# Patient Record
Sex: MALE | Race: BLACK OR AFRICAN AMERICAN | NOT HISPANIC OR LATINO | Employment: UNEMPLOYED | URBAN - METROPOLITAN AREA
[De-identification: names, ages, dates, MRNs, and addresses within clinical notes are randomized per-mention and may not be internally consistent; named-entity substitution may affect disease eponyms.]

---

## 2017-04-11 ENCOUNTER — OFFICE VISIT (OUTPATIENT)
Dept: OTOLARYNGOLOGY | Facility: CLINIC | Age: 22
End: 2017-04-11

## 2017-04-11 VITALS — WEIGHT: 190 LBS | BODY MASS INDEX: 25.73 KG/M2 | HEIGHT: 72 IN

## 2017-04-11 DIAGNOSIS — D36.10 NEUROFIBROMA: Primary | ICD-10-CM

## 2017-04-11 ASSESSMENT — PAIN SCALES - GENERAL: PAINLEVEL: NO PAIN (0)

## 2017-04-11 NOTE — MR AVS SNAPSHOT
After Visit Summary   4/11/2017    Nette Conklin    MRN: [id number]           Patient Information     Date Of Birth          1995        Visit Information        Provider Department      4/11/2017 2:30 PM Dante Davis MD [facility] Ear Nose and Throat        Today's Diagnoses     Neurofibroma    -  1       Follow-ups after your visit        Your next 10 appointments already scheduled     Apr 12, 2017  8:00 PM CDT   MR ORBIT FACE NECK W/O CONTRAST with UUMR1   [facility], [place], MRI ([facility], [facility])    [address] [facility] [address] [phone]           Take your medicines as usual, unless your doctor tells you not to. Bring a list of your current medicines to your exam (including vitamins, minerals and over-the-counter drugs). Also bring the results of similar scans you may have had.  Please remove any body piercings and hair extensions before you arrive.  Follow your doctor s orders. If you do not, we may have to postpone your exam.  You will not have contrast for this exam. You do not need to do anything special to prepare.  The MRI machine uses a strong magnet. Please wear clothes without metal (snaps, zippers). A sweatsuit works well, or we may give you a hospital gown.   **IMPORTANT** THE INSTRUCTIONS BELOW ARE ONLY FOR THOSE PATIENTS WHO HAVE BEEN TOLD THEY WILL RECEIVE SEDATION OR GENERAL ANESTHESIA DURING THEIR MRI PROCEDURE:  IF YOU WILL RECEIVE SEDATION (take medicine to help you relax during your exam):   You must get the medicine from your doctor before you arrive. Bring the medicine to the exam. Do not take it at home.   Arrive one hour early. Bring someone who can take you home after the test. Your medicine will make you sleepy. After the exam, you may not drive, take a bus or take a taxi by yourself.   No eating 8 hours before your exam. You may have clear liquids up until 4 hours before your exam. (Clear  liquids include water, clear tea, black coffee and fruit juice without pulp.)  IF YOU WILL RECEIVE ANESTHESIA (be asleep for your exam):   Arrive 1 1/2 hours early. Bring someone who can take you home after the test. You may not drive, take a bus or take a taxi by yourself.   No eating 8 hours before your exam. You may have clear liquids up until 4 hours before your exam. (Clear liquids include water, clear tea, black coffee and fruit juice without pulp.)   You will spend four to five hours in the recovery room.  Please call the Imaging Department at your exam site with any questions.              Future tests that were ordered for you today     Open Future Orders        Priority Expected Expires Ordered    MRI Orbit face neck w/o contrast Routine 2017            Who to contact     Please call your clinic at [phone] to:    Ask questions about your health    Make or cancel appointments    Discuss your medicines    Learn about your test results    Speak to your doctor   If you have compliments or concerns about an experience at your clinic, or if you wish to file a complaint, please contact [facility] Patient Relations at [phone] or email us at [email or web address]         Additional Information About Your Visit        YouAppiharSodaStream Information     Dayak is an electronic gateway that provides easy, online access to your medical records. With Dayak, you can request a clinic appointment, read your test results, renew a prescription or communicate with your care team.     To sign up for BDNAt visit the website at [email or web address]   You will be asked to enter the access code listed below, as well as some personal information. Please follow the directions to create your username and password.     Your access code is: [email or web address]  Expires: 2017  6:30 AM     Your access code will  in 90 days. If you need help or a new code, please  "contact your [facility] or call [phone] for assistance.        Care EveryWhere ID     This is your Care EveryWhere ID. This could be used by other organizations to access your [place] medical records  [id number]        Your Vitals Were     Height BMI (Body Mass Index)                1.835 m (6' 0.24\") 25.59 kg/m2           Blood Pressure from Last 3 Encounters:   12/06/14 121/58   09/15/14 113/72   02/28/13 116/62    Weight from Last 3 Encounters:   04/11/17 86.2 kg (190 lb) (88 %)*   12/06/14 83.8 kg (184 lb 12.8 oz) (90 %)*   09/15/14 85.2 kg (187 lb 12.8 oz) (92 %)*     * Growth percentiles are based on [facility] 2-20 Years data.               Primary Care Provider Office Phone # Fax #    Sarah Mcfadden [phone]       [facility] [address] [place] [id number]        Thank you!     Thank you for choosing [facility] EAR NOSE AND THROAT  for your care. Our goal is always to provide you with excellent care. Hearing back from our patients is one way we can continue to improve our services. Please take a few minutes to complete the written survey that you may receive in the mail after your visit with us. Thank you!             Your Updated Medication List - Protect others around you: Learn how to safely use, store and throw away your medicines at www.disposemymeds.org.      Notice  As of 4/11/2017  4:09 PM    You have not been prescribed any medications.      "

## 2017-04-11 NOTE — PATIENT INSTRUCTIONS
The plan will be to get a MRI to rule out any complications.  Once the scan is reviewed with Dr Davis we will notify you of the results.  Nahid Khan RN  [phone]

## 2017-04-11 NOTE — NURSING NOTE
Chief Complaint   Patient presents with     RECHECK     Return F/U     Pt states no pain today.    N Sharan GARCIAS

## 2017-04-11 NOTE — LETTER
4/11/2017       RE: Nette Conklin  [address] [place] [address]     Dear Colleague,    Thank you for referring your patient, Nette Conklin, to the [facility] EAR NOSE AND THROAT at [facility]. Please see a copy of my visit note below.    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  He actually has no complaint today but felt that since that time has passed no one has seen him in regard to the original diagnosis.      He has no vision changes.  He feels that he has no peripheral visual defects and no pain, headaches, fever or chills.      PAST MEDICAL HISTORY:  Once again reviewed.  There are no prolonged medications which he is on.      ALLERGIES:  There are no known allergies.      REVIEW OF SYSTEMS:  He is a nonsmoker, has no diplopia in all fields of gaze.  Denies alcohol use as well.      PHYSICAL EXAMINATION:  Shows mild ptosis of the right upper eyelid.  Otherwise, the eyes track in all directions without restriction.  Tympanic membranes intact and mobile.  Nares are patent.  Oral cavity is unremarkable.  Neck is supple.  There is no adenopathy.  There is no suggestion of recurrent neurofibroma.      ASSESSMENT:  Stable status post excision of right periorbital skull base mass/neurofibroma.      PLAN:  We will obtain an MRI at this time for intraorbital/pericranial assessment.  We will call him with the results.  I understand he wants no treatment, no intervention for the mild ptosis he has.          Again, thank you for allowing me to participate in the care of your patient.      Sincerely,    Dante Davis MD     cc: Sarah Mcfadden MD     [facility]     [facility] [place]  [address]      BAILEE/mike

## 2017-04-12 ENCOUNTER — HOSPITAL ENCOUNTER (OUTPATIENT)
Dept: MRI IMAGING | Facility: CLINIC | Age: 22
Discharge: HOME | End: 2017-04-12
Attending: OTOLARYNGOLOGY | Admitting: OTOLARYNGOLOGY
Payer: COMMERCIAL

## 2017-04-12 DIAGNOSIS — D36.10 NEUROFIBROMA: ICD-10-CM

## 2017-04-12 PROCEDURE — 70553 MRI BRAIN STEM W/O & W/DYE: CPT

## 2017-04-12 PROCEDURE — 25500064 ZZH RX 255 OP 636: Performed by: OTOLARYNGOLOGY

## 2017-04-12 PROCEDURE — A9585 GADOBUTROL INJECTION: HCPCS | Performed by: OTOLARYNGOLOGY

## 2017-04-12 RX ORDER — GADOBUTROL 604.72 MG/ML
10 INJECTION INTRAVENOUS ONCE
Status: COMPLETED | OUTPATIENT
Start: 2017-04-12 | End: 2017-04-12

## 2017-04-12 RX ADMIN — GADOBUTROL 10 ML: 604.72 INJECTION INTRAVENOUS at 20:38

## 2017-04-12 NOTE — PROGRESS NOTES
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  He actually has no complaint today but felt that since that time has passed no one has seen him in regard to the original diagnosis.      He has no vision changes.  He feels that he has no peripheral visual defects and no pain, headaches, fever or chills.      PAST MEDICAL HISTORY:  Once again reviewed.  There are no prolonged medications which he is on.      ALLERGIES:  There are no known allergies.      REVIEW OF SYSTEMS:  He is a nonsmoker, has no diplopia in all fields of gaze.  Denies alcohol use as well.      PHYSICAL EXAMINATION:  Shows mild ptosis of the right upper eyelid.  Otherwise, the eyes track in all directions without restriction.  Tympanic membranes intact and mobile.  Nares are patent.  Oral cavity is unremarkable.  Neck is supple.  There is no adenopathy.  There is no suggestion of recurrent neurofibroma.      ASSESSMENT:  Stable status post excision of right periorbital skull base mass/neurofibroma.      PLAN:  We will obtain an MRI at this time for intraorbital/pericranial assessment.  We will call him with the results.  I understand he wants no treatment, no intervention for the mild ptosis he has.       cc: Sarah Mcfadden MD     [facility]     [address] [place] [address]  [id number]      BAILEE/mike

## 2017-05-23 ENCOUNTER — OFFICE VISIT (OUTPATIENT)
Dept: OTOLARYNGOLOGY | Facility: CLINIC | Age: 22
End: 2017-05-23

## 2017-05-23 VITALS — BODY MASS INDEX: 25.18 KG/M2 | HEIGHT: 73 IN | WEIGHT: 190 LBS

## 2017-05-23 DIAGNOSIS — H05.30: Primary | ICD-10-CM

## 2017-05-23 ASSESSMENT — PAIN SCALES - GENERAL: PAINLEVEL: NO PAIN (0)

## 2017-05-23 NOTE — PROGRESS NOTES
HISTORY OF PRESENT ILLNESS:  Nette Conklin is now a 21-year-old who underwent excision of a right skull base lesion that extended to the orbit.  He is completely clear of the neurofibroma and returns today for discussion of his MRI findings.  The MRI showed some enhancement along the course of the optic nerve.  There is mild extension into the orbit itself.      The patient himself denies any diplopia, double vision, pain, headaches, fever or chills.  He has no meningeal signs or symptoms.      PHYSICAL EXAMINATION:  Examination does show some mild inferior orbital expansion which allows the eye to sit somewhat despotically inferiorly, but he has no double vision associated.  The eye moves in all directions without restriction.  There are no obvious lesions or masses noted extra orbited.  There is increased scleral show along the lateral portion of the lower lid and increased slitting along the lateral portion of the upper lip.      ASSESSMENT:  No obvious tumor regrowth at this time.  He does have some mild upper eyelid discrepancies.      PLAN:  I offered that he did visit with Dr. Jim Suero of oculoplastics for any reconsideration of repair and he declines that at this point.  He will follow up with me as needed.      cc: Sarah Mcfadden MD     [facility]     [facility] [facility] [place]  [address]       BAILEE/mike

## 2017-05-23 NOTE — NURSING NOTE
Chief Complaint   Patient presents with     RECHECK     Return Neurofibroma F/U     Pt states no pain today.    N Sharan LOPEZN

## 2017-05-23 NOTE — LETTER
5/23/2017       RE: Nette Conklin  [address] [place] [address]     Dear Colleague,    Thank you for referring your patient, Nette Conklin, to the [facility] EAR NOSE AND THROAT at [facility]. Please see a copy of my visit note below.    HISTORY OF PRESENT ILLNESS:  Nette Conklin is now a 21-year-old who underwent excision of a right skull base lesion that extended to the orbit.  He is completely clear of the neurofibroma and returns today for discussion of his MRI findings.  The MRI showed some enhancement along the course of the optic nerve.  There is mild extension into the orbit itself.      The patient himself denies any diplopia, double vision, pain, headaches, fever or chills.  He has no meningeal signs or symptoms.      PHYSICAL EXAMINATION:  Examination does show some mild inferior orbital expansion which allows the eye to sit somewhat despotically inferiorly, but he has no double vision associated.  The eye moves in all directions without restriction.  There are no obvious lesions or masses noted extra orbited.  There is increased scleral show along the lateral portion of the lower lid and increased slitting along the lateral portion of the upper lip.      ASSESSMENT:  No obvious tumor regrowth at this time.  He does have some mild upper eyelid discrepancies.      PLAN:  I offered that he did visit with Dr. Jim Suero of oculoplastics for any reconsideration of repair and he declines that at this point.  He will follow up with me as needed.     BAILEE/mike   Again, thank you for allowing me to participate in the care of your patient.      Sincerely,  Dante Davis MD  cc: Sarah Mcfadden MD     [facility]     [address] [place] [address]  [id number]

## 2017-05-23 NOTE — PATIENT INSTRUCTIONS
Thanks for coming in today for MRI review.  Please call/contact with further questions/concerns.     Sincerely,    SUKHWINDER Ortiz R.N.    ENT appointment scheduling [phone] option 1  ENT Triage Team  [phone] option 3  ENT  Fax: [phone]     Concerns for after clinic hours/weekend, that can not wait until clinic is open, [phone] option 4 ask for the ENT resident on call.     Address: [facility]  ENT clinic    [address] [place] [address]   4th Floor

## 2017-05-23 NOTE — MR AVS SNAPSHOT
After Visit Summary   5/23/2017    Nette Conklin    MRN: [id number]           Patient Information     Date Of Birth          1995        Visit Information        Provider Department      5/23/2017 2:15 PM Dante Davis MD [facility] Ear Nose and Throat        Today's Diagnoses     Orbital deformity, acquired    -  1      Care Instructions     Thanks for coming in today for MRI review.  Please call/contact with further questions/concerns.     Sincerely,    SUKHWINDER Ortiz R.N.    ENT appointment scheduling [phone] option 1  ENT Triage Team  [phone] option 3  ENT  Fax: [phone]     Concerns for after clinic hours/weekend, that can not wait until clinic is open, [phone] option 4 ask for the ENT resident on call.     Address: [facility]  ENT clinic    [address] [place] [address]   4th Floor               Follow-ups after your visit        Follow-up notes from your care team     Return if symptoms worsen or fail to improve.      Who to contact     Please call your clinic at [phone] to:    Ask questions about your health    Make or cancel appointments    Discuss your medicines    Learn about your test results    Speak to your doctor   If you have compliments or concerns about an experience at your clinic, or if you wish to file a complaint, please contact [facility] Patient Relations at [phone] or email us at [email or web address]         Additional Information About Your Visit        MyChart Information     Adjacent Applicationst is an electronic gateway that provides easy, online access to your medical records. With Powelectrics, you can request a clinic appointment, read your test results, renew a prescription or communicate with your care team.     To sign up for Adjacent Applicationst visit the website at [email or web address]   You will be asked to enter the access code listed below, as well as some personal information. Please follow the  "directions to create your username and password.     Your access code is: [email or web address]  Expires: 2017  6:30 AM     Your access code will  in 90 days. If you need help or a new code, please contact your [facility] or call [phone] for assistance.        Care EveryWhere ID     This is your Care EveryWhere ID. This could be used by other organizations to access your [place] medical records  [id number]        Your Vitals Were     Height BMI (Body Mass Index)                1.855 m (6' 1.03\") 25.05 kg/m2           Blood Pressure from Last 3 Encounters:   14 121/58   09/15/14 113/72   13 116/62    Weight from Last 3 Encounters:   17 86.2 kg (190 lb) (87 %)*   17 86.2 kg (190 lb) (88 %)*   14 83.8 kg (184 lb 12.8 oz) (90 %)*     * Growth percentiles are based on CDC 2-20 Years data.              Today, you had the following     No orders found for display       Primary Care Provider Office Phone # Fax #    Sarah Mcfadden [phone]       [address] [address]        Thank you!     Thank you for choosing [facility] EAR NOSE AND THROAT  for your care. Our goal is always to provide you with excellent care. Hearing back from our patients is one way we can continue to improve our services. Please take a few minutes to complete the written survey that you may receive in the mail after your visit with us. Thank you!             Your Updated Medication List - Protect others around you: Learn how to safely use, store and throw away your medicines at www.disposemymeds.org.      Notice  As of 2017 11:59 PM    You have not been prescribed any medications.      "

## 2017-07-09 ENCOUNTER — HOSPITAL ENCOUNTER (EMERGENCY)
Facility: CLINIC | Age: 22
Discharge: HOME | End: 2017-07-09
Attending: PHYSICIAN ASSISTANT | Admitting: PHYSICIAN ASSISTANT
Payer: COMMERCIAL

## 2017-07-09 ENCOUNTER — APPOINTMENT (OUTPATIENT)
Dept: GENERAL RADIOLOGY | Facility: CLINIC | Age: 22
End: 2017-07-09
Attending: PHYSICIAN ASSISTANT
Payer: COMMERCIAL

## 2017-07-09 VITALS
TEMPERATURE: 98.9 F | HEIGHT: 74 IN | DIASTOLIC BLOOD PRESSURE: 64 MMHG | WEIGHT: 200 LBS | SYSTOLIC BLOOD PRESSURE: 156 MMHG | OXYGEN SATURATION: 98 % | BODY MASS INDEX: 25.67 KG/M2 | HEART RATE: 88 BPM | RESPIRATION RATE: 18 BRPM

## 2017-07-09 DIAGNOSIS — S83.421A SPRAIN OF LATERAL COLLATERAL LIGAMENT OF RIGHT KNEE, INITIAL ENCOUNTER: ICD-10-CM

## 2017-07-09 PROCEDURE — 29505 APPLICATION LONG LEG SPLINT: CPT | Mod: RT

## 2017-07-09 PROCEDURE — 73562 X-RAY EXAM OF KNEE 3: CPT | Mod: RT

## 2017-07-09 PROCEDURE — 99284 EMERGENCY DEPT VISIT MOD MDM: CPT | Mod: 25

## 2017-07-09 PROCEDURE — 25000132 ZZH RX MED GY IP 250 OP 250 PS 637: Performed by: PHYSICIAN ASSISTANT

## 2017-07-09 RX ORDER — IBUPROFEN 200 MG
600 TABLET ORAL ONCE
Status: COMPLETED | OUTPATIENT
Start: 2017-07-09 | End: 2017-07-09

## 2017-07-09 RX ADMIN — IBUPROFEN 600 MG: 200 TABLET, FILM COATED ORAL at 14:02

## 2017-07-09 ASSESSMENT — ENCOUNTER SYMPTOMS
NUMBNESS: 0
BACK PAIN: 0

## 2017-07-09 NOTE — ED NOTES
Pt reports he was playing football yesterday, was hit from behind, fell onto right knee, felt instant pain.  Took a Oxycodone yesterday with some pain relief.  Was ambulatory yesterday but unable to tolerate ambulating on RLE today.

## 2017-07-09 NOTE — ED AVS SNAPSHOT
Emergency Department    [address] [place] [address]    Phone:  [phone]    Fax:  [phone]                                       Nette Conklin   MRN: [id number]    Department:   Emergency Department   Date of Visit:  7/9/2017           After Visit Summary Signature Page     I have received my discharge instructions, and my questions have been answered. I have discussed any challenges I see with this plan with the nurse or doctor.    ..........................................................................................................................................  Patient/Patient Representative Signature      ..........................................................................................................................................  Patient Representative Print Name and Relationship to Patient    ..................................................               ................................................  Date                                            Time    ..........................................................................................................................................  Reviewed by Signature/Title    ...................................................              ..............................................  Date                                                            Time

## 2017-07-09 NOTE — DISCHARGE INSTRUCTIONS
Use Acetaminophen and/or Ibuprofen as needed for pain.      Rest, ice and elevate the knee.     Follow-up with orthopedics in 5-7 days for a recheck.     Return to the ED with worsening pain, numbness, tingling, weakness, or if you become worse in any way.       Discharge Instructions  Extremity Injury    You were seen today for an injury to an extremity (arm, hand, leg, or foot). You may have a bruise, strain, or fracture (broken bone).    Return to the Emergency Department or see your regular doctor if your injured area is not back to normal within 5-7 days.    Return to the Emergency Department right away if:    Your pain seems to change or get worse or there is pain in a new area.    Your extremity becomes pale, cool, blue, or numb or tingling past the injury.    You have more drainage, redness or pain in the area of the cut or abrasion.    You have pain that you can t control with the medicine recommended or prescribed here, or you have pain that seems too much for your injury.    Your child will not stop crying or is much more fussy than normal.    You have new symptoms or anything that worries you.    What to Expect:    Your swelling and pain may be worse the day after your injury, but should not be severe and should start getting better after that. You should not have new symptoms and your pain should not get worse.    You may start to get a bruise over the injured area or below the injured area.    Your movement and strength should get better with time.    Some injuries may not show up until after you have left the Emergency Department so it is important to follow-up with your regular doctor.    Your injury may prevent you from working.  Follow-up with your regular doctor to get a work release note.    Pain medications or your injury may make it unsafe to drive or operate machinery.    Home Care:    Apply ice your injured area for 15 minutes at a time, at least 3 times a day. Use a cloth between the ice bag  and your skin to prevent frostbite.     Do not sleep with an ice pack or heating pad on, since this can cause burns or skin injury.    Rest your injured area for at least 1-2 days. After that you may start using your extremity again as long as there is not too much pain.     Raise the injured area above the level of your heart as much as possible in the first 1-2 days.    Use Tylenol  (acetaminophen), Motrin (ibuprofen), or Advil  (ibuprofen) for your pain unless you have an allergy or are told not to use these medications by your doctor.  Take the medications as instructed on the package. Tylenol  (acetaminophen) is in many prescription medicines and non-prescription medicines--check all of your medicines to be sure you aren t taking more than 3000 mg per day.    You may use an elastic bandage (Ace  Wrap) if it makes you more comfortable. Wrap it just tight enough to provide light compression, like a new pair of socks feels. Loosen the bandage if you have swelling past the bandage.      Please follow any other instructions that were discussed with you by your doctor.    MORE INFORMATION:    X-rays:  X-rays done today were read by your doctor but will also be read by a radiologist.  We will contact you if the radiologist sees anything different on the x-ray.  Your regular doctor may also want to review your x-rays on follow-up.    You could have a fracture (break), even if we told you your x-rays were normal. X-rays are not always certain, and some fractures are hard to see and may not show up right away.  Also, your x-ray may look like you have a fracture, even though you do not.  It is important to follow-up with your regular doctor.     Stretching:  If your injury was to your arm or shoulder and your doctor put you in a sling or an immobilizer, it is important that you take off your immobilizer within 3 days and stretch/move your shoulder, unless your doctor specifically tells you to not move your shoulder.  This  is to prevent further injury such as a  frozen shoulder .     If you were given a prescription for medicine here today, be sure to read all of the information (including the package insert) that comes with your prescription.  This will include important information about the medicine, its side effects, and any warnings that you need to know about.  The pharmacist who fills the prescription can provide more information and answer questions you may have about the medicine.  If you have questions or concerns that the pharmacist cannot address, please call or return to the Emergency Department.         Remember that you can always come back to the Emergency Department if you are not able to see your regular doctor in the amount of time listed above, if you get any new symptoms, or if there is anything that worries you.        Knee Sprain    A sprain is an injury to the ligaments or capsule that holds a joint together. There are no broken bones. Most sprains take 3 to 6 weeks to heal. If it a severe sprain where the ligament is completely torn, it can take months to recover.  Most knee sprains are treated with a splint, knee immobilizer brace, or elastic wrap for support. Severe sprains may require surgery.  Home care    Stay off the injured leg as much as possible until you can walk on it without pain. If you have a lot of pain with walking, crutches or a walker may be prescribed. (These can be rented or purchased at many pharmacies and surgical or orthopedic supply stores). Follow your healthcare provider's advice about when to begin putting weight on that leg.    Keep your leg elevated to reduce pain and swelling. When sleeping, place a pillow under the injured leg. When sitting, support the injured leg so it is level with your waist. This is very important during the first 48 hours.    Apply an ice pack over the injured area for 15 to 20 minutes every 3 to 6 hours. You should do this for the first 24 to 48 hours. You  can make an ice pack by filling a plastic bag that seals at the top with ice cubes and then wrapping it with a thin towel. Continue to use ice packs for relief of pain and swelling as needed. As the ice melts, be careful to avoid getting your wrap, splint, or cast wet. After 48 hours, apply heat (warm shower or warm bath) for 15 to 20 minutes several times a day, or alternate ice and heat. You can place the ice pack directly over the splint. If you have to wear a hook-and-loop knee brace, you can open it to apply the ice pack, or heat, directly to the knee. Never put ice directly on the skin. Always wrap the ice in a towel or other type of cloth.    You may use over-the-counter pain medicine to control pain, unless another pain medicine was prescribed.If you have chronic liver or kidney disease or ever had a stomach ulcer or GI bleeding, talk with your healthcare provider before using these medicines.    If you were given a splint, keep it completely dry at all times. Bathe with your splint out of the water, protected with 2 large plastic bags, rubber-banded at the top end. If a fiberglass splint gets wet, you can dry it with a hair dryer. If you have a hook-and-loop knee brace, you can remove this to bathe, unless told otherwise.  Follow-up care  Follow up with your doctor as advised. Any X-rays you had today don t show any broken bones, breaks, or fractures. Sometimes fractures don t show up on the first X-ray. Bruises and sprains can sometimes hurt as much as a fracture. These injuries can take time to heal completely. If your symptoms don t improve or they get worse, talk with your doctor. You may need a repeat X-ray. If X-rays were taken, you will be told of any new findings that may affect your care.  Call 911  Call 911 if you have:     Shortness of breath     Chest pain  When to seek medical advice  Call your healthcare provider right away if any of these occur:    The splint or knee immobilizer brace becomes  wet or soft    The fiberglass cast or splint remains wet for more than 24 hours    Pain or swelling increases    The injured leg or toes become cold, blue, numb, or tingly  Date Last Reviewed: 11/20/2015 2000-2017 [employer]. [address] [place] [id number]. All rights reserved. This information is not intended as a substitute for professional medical care. Always follow your healthcare professional's instructions.

## 2017-07-09 NOTE — ED PROVIDER NOTES
"  History     Chief Complaint:  Knee Pain (rt knee)    HPI   Nette Conklin is a 22 year old male who presents with knee pain. The patient reports that he was hit from behind yesterday while playing football. He fell down on his right knee and felt it buckle. He could walk immediately after, but has been having gradually worsening pain with significant lateral knee pain today and the knee feels like it is going to keep buckling on him. He denies any numbness, tingling, or history of knee injuries. The pain does not radiate. He has been icing on and off since the incident as well as taking one oxycodone for pain already today. He is currently having trouble bending the knee here in the ED.    Allergies:  No Known Drug Allergies     Medications:    The patient is not currently taking any prescribed medications.  Took an Oxycodone today    Past Medical History:    Acne  Mass of eye, right    Past Surgical History:    Craniotomy, removal of temporal lobe lesion  Orbitotomy  Resection of right orbital mass    Family History:    The patient denies any relevant family medical history.    Social History:  The patient was accompanied to the ED by his brother and sister-in-law.  Smoking Status: No  Smokeless Tobacco: No  Alcohol Use: No  Marital Status:  Single     Review of Systems   Musculoskeletal: Positive for gait problem. Negative for back pain.        + right knee pain   Skin: Negative for color change and wound.   Neurological: Negative for weakness and numbness.     Physical Exam   Vitals:  Patient Vitals for the past 24 hrs:   BP Temp Temp src Pulse Resp SpO2 Height Weight   07/09/17 1512 156/64 - - 88 18 98 % - -   07/09/17 1344 165/58 98.9  F (37.2  C) Oral 82 18 97 % 1.88 m (6' 2\") 90.7 kg (200 lb)        Physical Exam  General:         Resting comfortably on the gurney.     Head:              The scalp, head and face appear normal. No evidence of trauma.    Resp:              Non-labored breathing. No " tachypnea.    CV:                  DP and PT pulses 2+ on the right                            Capillary refill less than two seconds right toes.    MS:                  Normal muscular tone.                            5/5 and symmetric strength with dorsi- and plantar-flexion at the great toe and ankle, limited right knee flexion and extension due to pain                           Normal and symmetric ROM with dorsi- and plantar-flexion at the great toe and ankle, limited right knee flexion and extension due to pain                          Right lateral knee tenderness and swelling with palpation, the remainder of the right lower extremity is non-tender to palpation.      Negative right anterior and posterior drawer. No laxity with valgus stress, but mild laxity and pain with varus stress, still good end point with varus stress   Neuro:            Awake and alert.                            Sensation intact to light touch bilateral lower extremities, including distal to the injury.    Skin:               No rash, ecchymosis, abrasions or lacerations.   Psych:             Normal affect. Appropriate interactions.       Emergency Department Course     Imaging:  Radiology findings were communicated with the patient who voiced understanding of the findings.  Knee XR, 3 views, right:  No evidence of acute fracture or malalignment. There is a  right knee effusion.  Per Radiologist.     Interventions:  1402 Ibuprofen, 600 mg, PO     Emergency Department Course:  Nursing notes and vitals reviewed.  I performed an exam of the patient as documented above.   The patient was sent for a XR while in the emergency department, results above.   The patient was updated on the results of their imaging.    I discussed the treatment plan with the patient. They expressed understanding of this plan and consented to discharge. They will be discharged home with instructions for care and follow up. In addition, the patient will return to  the emergency department if their symptoms persist, worsen, if new symptoms arise or if there is any concern.  All questions were answered.      Impression & Plan      Medical Decision Making:  Nette Conklin is a 22 year old male  who presents for evaluation of the right knee.  His exam findings are noted above, most pertinent is no redness, warmth to knee making septic arthritis and/or crystal disease of joint very unlikely.  Signs and symptoms are consistent with a knee sprain of his LCL. XR negative for fracture, dislocation or other acute abnormality other than a knee effusion.  A broad differential was also considered including sprain, strain, fracture, tendon rupture, nerve impingement/compromise, referred pain. Supportive outpatient management is indicated. He was place in a knee immobilizer and given crutches. Orthopedic follow-up in 5-7 days.  Rest, ice, and elevation treatment was discussed with the patient. Sprain instructions provided and reasons to return to the ED discussed. I discussed the results, plan and any additional questions with the patient. He verbalized understanding and agreement with the plan.      Diagnosis:    ICD-10-CM    1. Sprain of lateral collateral ligament of right knee, initial encounter S83.421A      Disposition:   Discharge    Scribe Disclosure:  I, Sammy Shields, am serving as a scribe at 1:41 PM on 7/9/2017 to document services personally performed by No att. providers found, based on my observations and the provider's statements to me.    7/9/2017    EMERGENCY DEPARTMENT       Christianne Cabral PA-C  07/10/17 0979

## 2017-07-09 NOTE — ED AVS SNAPSHOT
Emergency Department    [address] [facility] [address]    Phone:  [phone]    Fax:  [phone]                                       Nette Conklin   MRN: [id number]    Department:   Emergency Department   Date of Visit:  7/9/2017           Patient Information     Date Of Birth          1995        Your diagnoses for this visit were:     Sprain of lateral collateral ligament of right knee, initial encounter        You were seen by Christianne Cabral PA-C.      Follow-up Information     Follow up with Orthopaedics, [facility] In 1 week.    Contact information:    [address] [id number] [phone]          Follow up with  Emergency Department.    Specialty:  EMERGENCY MEDICINE    Why:  If symptoms worsen    Contact information:    [address] [facility] [address] [phone]        Discharge Instructions       Use Acetaminophen and/or Ibuprofen as needed for pain.      Rest, ice and elevate the knee.     Follow-up with orthopedics in 5-7 days for a recheck.     Return to the ED with worsening pain, numbness, tingling, weakness, or if you become worse in any way.       Discharge Instructions  Extremity Injury    You were seen today for an injury to an extremity (arm, hand, leg, or foot). You may have a bruise, strain, or fracture (broken bone).    Return to the Emergency Department or see your regular doctor if your injured area is not back to normal within 5-7 days.    Return to the Emergency Department right away if:    Your pain seems to change or get worse or there is pain in a new area.    Your extremity becomes pale, cool, blue, or numb or tingling past the injury.    You have more drainage, redness or pain in the area of the cut or abrasion.    You have pain that you can t control with the medicine recommended or prescribed here, or you have pain that seems too much for your injury.    Your child will not stop crying or is much more  fussy than normal.    You have new symptoms or anything that worries you.    What to Expect:    Your swelling and pain may be worse the day after your injury, but should not be severe and should start getting better after that. You should not have new symptoms and your pain should not get worse.    You may start to get a bruise over the injured area or below the injured area.    Your movement and strength should get better with time.    Some injuries may not show up until after you have left the Emergency Department so it is important to follow-up with your regular doctor.    Your injury may prevent you from working.  Follow-up with your regular doctor to get a work release note.    Pain medications or your injury may make it unsafe to drive or operate machinery.    Home Care:    Apply ice your injured area for 15 minutes at a time, at least 3 times a day. Use a cloth between the ice bag and your skin to prevent frostbite.     Do not sleep with an ice pack or heating pad on, since this can cause burns or skin injury.    Rest your injured area for at least 1-2 days. After that you may start using your extremity again as long as there is not too much pain.     Raise the injured area above the level of your heart as much as possible in the first 1-2 days.    Use Tylenol  (acetaminophen), Motrin (ibuprofen), or Advil  (ibuprofen) for your pain unless you have an allergy or are told not to use these medications by your doctor.  Take the medications as instructed on the package. Tylenol  (acetaminophen) is in many prescription medicines and non-prescription medicines--check all of your medicines to be sure you aren t taking more than 3000 mg per day.    You may use an elastic bandage (Ace  Wrap) if it makes you more comfortable. Wrap it just tight enough to provide light compression, like a new pair of socks feels. Loosen the bandage if you have swelling past the bandage.      Please follow any other instructions that  were discussed with you by your doctor.    MORE INFORMATION:    X-rays:  X-rays done today were read by your doctor but will also be read by a radiologist.  We will contact you if the radiologist sees anything different on the x-ray.  Your regular doctor may also want to review your x-rays on follow-up.    You could have a fracture (break), even if we told you your x-rays were normal. X-rays are not always certain, and some fractures are hard to see and may not show up right away.  Also, your x-ray may look like you have a fracture, even though you do not.  It is important to follow-up with your regular doctor.     Stretching:  If your injury was to your arm or shoulder and your doctor put you in a sling or an immobilizer, it is important that you take off your immobilizer within 3 days and stretch/move your shoulder, unless your doctor specifically tells you to not move your shoulder.  This is to prevent further injury such as a  frozen shoulder .     If you were given a prescription for medicine here today, be sure to read all of the information (including the package insert) that comes with your prescription.  This will include important information about the medicine, its side effects, and any warnings that you need to know about.  The pharmacist who fills the prescription can provide more information and answer questions you may have about the medicine.  If you have questions or concerns that the pharmacist cannot address, please call or return to the Emergency Department.         Remember that you can always come back to the Emergency Department if you are not able to see your regular doctor in the amount of time listed above, if you get any new symptoms, or if there is anything that worries you.        Knee Sprain    A sprain is an injury to the ligaments or capsule that holds a joint together. There are no broken bones. Most sprains take 3 to 6 weeks to heal. If it a severe sprain where the ligament is  completely torn, it can take months to recover.  Most knee sprains are treated with a splint, knee immobilizer brace, or elastic wrap for support. Severe sprains may require surgery.  Home care    Stay off the injured leg as much as possible until you can walk on it without pain. If you have a lot of pain with walking, crutches or a walker may be prescribed. (These can be rented or purchased at many pharmacies and surgical or orthopedic supply stores). Follow your healthcare provider's advice about when to begin putting weight on that leg.    Keep your leg elevated to reduce pain and swelling. When sleeping, place a pillow under the injured leg. When sitting, support the injured leg so it is level with your waist. This is very important during the first 48 hours.    Apply an ice pack over the injured area for 15 to 20 minutes every 3 to 6 hours. You should do this for the first 24 to 48 hours. You can make an ice pack by filling a plastic bag that seals at the top with ice cubes and then wrapping it with a thin towel. Continue to use ice packs for relief of pain and swelling as needed. As the ice melts, be careful to avoid getting your wrap, splint, or cast wet. After 48 hours, apply heat (warm shower or warm bath) for 15 to 20 minutes several times a day, or alternate ice and heat. You can place the ice pack directly over the splint. If you have to wear a hook-and-loop knee brace, you can open it to apply the ice pack, or heat, directly to the knee. Never put ice directly on the skin. Always wrap the ice in a towel or other type of cloth.    You may use over-the-counter pain medicine to control pain, unless another pain medicine was prescribed.If you have chronic liver or kidney disease or ever had a stomach ulcer or GI bleeding, talk with your healthcare provider before using these medicines.    If you were given a splint, keep it completely dry at all times. Bathe with your splint out of the water, protected  with 2 large plastic bags, rubber-banded at the top end. If a fiberglass splint gets wet, you can dry it with a hair dryer. If you have a hook-and-loop knee brace, you can remove this to bathe, unless told otherwise.  Follow-up care  Follow up with your doctor as advised. Any X-rays you had today don t show any broken bones, breaks, or fractures. Sometimes fractures don t show up on the first X-ray. Bruises and sprains can sometimes hurt as much as a fracture. These injuries can take time to heal completely. If your symptoms don t improve or they get worse, talk with your doctor. You may need a repeat X-ray. If X-rays were taken, you will be told of any new findings that may affect your care.  Call 911  Call 911 if you have:     Shortness of breath     Chest pain  When to seek medical advice  Call your healthcare provider right away if any of these occur:    The splint or knee immobilizer brace becomes wet or soft    The fiberglass cast or splint remains wet for more than 24 hours    Pain or swelling increases    The injured leg or toes become cold, blue, numb, or tingly  Date Last Reviewed: 11/20/2015 2000-2017 [employer]. [address] [place] [address], [place] [address]. All rights reserved. This information is not intended as a substitute for professional medical care. Always follow your healthcare professional's instructions.          24 Hour Appointment Hotline       To make an appointment at any [facility], call [phone] ([phone]). If you don't have a family doctor or clinic, we will help you find one. [facility] are conveniently located to serve the needs of you and your family.             Review of your medicines      Notice     You have not been prescribed any medications.            Procedures and tests performed during your visit     Knee XR, 3 views, right      Orders Needing Specimen Collection     None      Pending Results     No orders found from 7/7/2017 to  7/10/2017.            Pending Culture Results     No orders found from 7/7/2017 to 7/10/2017.            Pending Results Instructions     If you had any lab results that were not finalized at the time of your Discharge, you can call the ED Lab Result RN at [phone]. You will be contacted by this team for any positive Lab results or changes in treatment. The nurses are available 7 days a week from 10A to 6:30P.  You can leave a message 24 hours per day and they will return your call.        Test Results From Your Hospital Stay        7/9/2017  2:50 PM      Narrative     XR KNEE RT 3 VW 7/9/2017 2:14 PM    HISTORY: Injury.    COMPARISON: None.        Impression     IMPRESSION: No evidence of acute fracture or malalignment. There is a  right knee effusion.    ALBER MOON MD                Clinical Quality Measure: Blood Pressure Screening     Your blood pressure was checked while you were in the emergency department today. The last reading we obtained was  BP: 156/64 . Please read the guidelines below about what these numbers mean and what you should do about them.  If your systolic blood pressure (the top number) is less than 120 and your diastolic blood pressure (the bottom number) is less than 80, then your blood pressure is normal. There is nothing more that you need to do about it.  If your systolic blood pressure (the top number) is 120-139 or your diastolic blood pressure (the bottom number) is 80-89, your blood pressure may be higher than it should be. You should have your blood pressure rechecked within a year by a primary care provider.  If your systolic blood pressure (the top number) is 140 or greater or your diastolic blood pressure (the bottom number) is 90 or greater, you may have high blood pressure. High blood pressure is treatable, but if left untreated over time it can put you at risk for heart attack, stroke, or kidney failure. You should have your blood pressure rechecked by a primary care  "provider within the next 4 weeks.  If your provider in the emergency department today gave you specific instructions to follow-up with your doctor or provider even sooner than that, you should follow that instruction and not wait for up to 4 weeks for your follow-up visit.        Thank you for choosing [facility]       Thank you for choosing [facility] for your care. Our goal is always to provide you with excellent care. Hearing back from our patients is one way we can continue to improve our services. Please take a few minutes to complete the written survey that you may receive in the mail after you visit with us. Thank you!        [employer] Information     [employer] lets you send messages to your doctor, view your test results, renew your prescriptions, schedule appointments and more. To sign up, go to [email or web address] . Click on \"Log in\" on the left side of the screen, which will take you to the Welcome page. Then click on \"Sign up Now\" on the right side of the page.     You will be asked to enter the access code listed below, as well as some personal information. Please follow the directions to create your username and password.     Your access code is: [email or web address]  Expires: 10/7/2017  3:12 PM     Your access code will  in 90 days. If you need help or a new code, please call your [facility] clinic or [phone].        Care EveryWhere ID     This is your Care EveryWhere ID. This could be used by other organizations to access your [facility] medical records  [id number]        Equal Access to Services     JOSSELYN ONTIVEROS : Hadii deny millero So[place], waaxda luqadaha, qaybta kaalmada gavin dick . [facility] [phone].    ATENCIÓN: Si habla español, tiene a vegas disposición servicios gratuitos de asistencia lingüística. Llame al [phone].    We comply with applicable federal civil rights laws and [place] laws. We do not discriminate on the basis of race, color, " national origin, age, disability sex, sexual orientation or gender identity.            After Visit Summary       This is your record. Keep this with you and show to your community pharmacist(s) and doctor(s) at your next visit.

## 2017-07-10 ASSESSMENT — ENCOUNTER SYMPTOMS
WOUND: 0
COLOR CHANGE: 0
WEAKNESS: 0

## 2018-03-14 ENCOUNTER — OFFICE VISIT (OUTPATIENT)
Dept: ORTHOPEDICS | Facility: CLINIC | Age: 23
End: 2018-03-14
Payer: COMMERCIAL

## 2018-03-14 ENCOUNTER — RADIANT APPOINTMENT (OUTPATIENT)
Dept: GENERAL RADIOLOGY | Facility: CLINIC | Age: 23
End: 2018-03-14
Payer: COMMERCIAL

## 2018-03-14 ENCOUNTER — RADIANT APPOINTMENT (OUTPATIENT)
Dept: MRI IMAGING | Facility: CLINIC | Age: 23
End: 2018-03-14
Attending: ORTHOPAEDIC SURGERY
Payer: COMMERCIAL

## 2018-03-14 VITALS — HEIGHT: 74 IN | BODY MASS INDEX: 23.1 KG/M2 | WEIGHT: 180 LBS

## 2018-03-14 DIAGNOSIS — G89.29 CHRONIC PAIN OF RIGHT KNEE: ICD-10-CM

## 2018-03-14 DIAGNOSIS — M25.561 CHRONIC PAIN OF RIGHT KNEE: Primary | ICD-10-CM

## 2018-03-14 DIAGNOSIS — M25.561 CHRONIC PAIN OF RIGHT KNEE: ICD-10-CM

## 2018-03-14 DIAGNOSIS — G89.29 CHRONIC PAIN OF RIGHT KNEE: Primary | ICD-10-CM

## 2018-03-14 NOTE — NURSING NOTE
Reason For Visit:   Chief Complaint   Patient presents with     Consult     Right knee       ?  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  Work status?  Full time.  Date of injury: 07/07/2017  Type of injury: Playing football when he was hit from the side.  Date of surgery: 08/02/2017  Type of surgery: Right knee ACL Reconstruction with Dr. Lopez at [facility]  Smoker: No  Request smoking cessation information: No    Pain Assessment  Patient Currently in Pain: No

## 2018-03-14 NOTE — LETTER
Date:March 15, 2018      Patient was self referred, no letter generated. Do not send.        [facility] Information

## 2018-03-14 NOTE — PROGRESS NOTES
"Nette Conlkin  is a pleasant 22-year-old male seen to my orthopedic clinic today for evaluation of his right knee.  He sustained injury to his right knee in July 2017 he underwent ACL reconstruction August 2, 2017 the operative report is in care everywhere.  This was bone tendon bone autograft partial lateral meniscectomy a medial meniscus tear was identified, found to be stable, and left alone.  He comes in today with a chief complaint of \"my leg is bent\" he is now 7-1/2 months following his ACL reconstruction.  He is done physical therapy until approximately December when he reports that he stopped secondary to his job.  He presents to see me today for second opinion.    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  He enjoys working out.    Review of systems: Negative for fever chills sweats no chest pain no shortness breath no nausea vomiting no bowel or bladder symptoms tend other systems reviewed and negative    Physical exam: Pleasant male no acute distress articulate and interactive.  Range of motion today is approximately 12-90 .  His knee is stable to varus and valgus stress testing.  I could not demonstrate instability on anterior or posterior drawer testing.  Lockman was limited by guarding.  Pivot shift was limited by patient   Limited range of motion and guarding.  Neurovascularly he is intact.    MRI from before his injury shows a rupture of his ACL, lateral meniscus tear.    Clinical assessment  1.  7 a half months following bone tendon bone autograft ACL reconstruction.  No definite recurrent instability though graft is difficult to assess.  2.  Arthrofibrosis with motion limited 12-90   3.  Status post partial lateral meniscectomy.  Postoperative pictures are reasonable " meniscectomy.  4.  Known medial meniscus tear that was found to be stable.    Plan:  We will get new radiographs today.  Allow slow the postoperative tunnels and hardware.  We will get an MRI of his knee.  I will plan to see him back in clinic when complete.  In a very minimum he may need a manipulation under anesthesia and arthroscopic lysis of adhesions.  Depending on the appearance of the graft as well as the position of the tunnels and hardware, he may need revision ACL reconstruction.

## 2018-03-14 NOTE — LETTER
"  3/14/2018      RE: Nette Conklin  [address] [place] [address]       Nette Conklin  is a pleasant 22-year-old male seen to my orthopedic clinic today for evaluation of his right knee.  He sustained injury to his right knee in July 2017 he underwent ACL reconstruction August 2, 2017 the operative report is in care everywhere.  This was bone tendon bone autograft partial lateral meniscectomy a medial meniscus tear was identified, found to be stable, and left alone.  He comes in today with a chief complaint of \"my leg is bent\" he is now 7-1/2 months following his ACL reconstruction.  He is done physical therapy until approximately December when he reports that he stopped secondary to his job.  He presents to see me today for second opinion.    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    Review of systems: Negative for fever chills sweats no chest pain no shortness breath no nausea vomiting no bowel or bladder symptoms tend other systems reviewed and negative    Physical exam: Pleasant male no acute distress articulate and interactive.  Range of motion today is approximately 12-90 .  His knee is stable to varus and valgus stress testing.  I could not demonstrate instability on anterior or posterior drawer testing.  Lockman was limited by guarding.  Pivot shift was limited by patient   Limited range of motion and guarding.  Neurovascularly he is intact.    MRI from before his injury shows a rupture of his ACL, lateral meniscus tear.    Clinical assessment  1.  7 a half months following bone tendon bone autograft ACL reconstruction.  No definite recurrent instability though graft is difficult to assess.  2.  Arthrofibrosis with motion limited 12-90   3. "  Status post partial lateral meniscectomy.  Postoperative pictures are reasonable meniscectomy.  4.  Known medial meniscus tear that was found to be stable.    Plan:  We will get new radiographs today.  Allow slow the postoperative tunnels and hardware.  We will get an MRI of his knee.  I will plan to see him back in clinic when complete.  In a very minimum he may need a manipulation under anesthesia and arthroscopic lysis of adhesions.  Depending on the appearance of the graft as well as the position of the tunnels and hardware, he may need revision ACL reconstruction.    Jamie Russell MD

## 2018-03-14 NOTE — MR AVS SNAPSHOT
After Visit Summary   3/14/2018    Nette Conklin    MRN: [id number]           Patient Information     Date Of Birth          1995        Visit Information        Provider Department      3/14/2018 11:00 AM Jamie Russell MD [facility]        Today's Diagnoses     Chronic pain of right knee    -  1       Follow-ups after your visit        Your next 10 appointments already scheduled     Mar 14, 2018  4:00 PM CDT   (Arrive by 3:45 PM)   MR KNEE RIGHT W/O CONTRAST with [id number]   [facility] [place] [facility] ([facility] [place])    [address] [address] [phone]           Take your medicines as usual, unless your doctor tells you not to. Bring a list of your current medicines to your exam (including vitamins, minerals and over-the-counter drugs). Also bring the results of similar scans you may have had.  Please remove any body piercings and hair extensions before you arrive.  Follow your doctor s orders. If you do not, we may have to postpone your exam.  You may or may not receive IV contrast for this exam pending the discretion of the Radiologist.  You do not need to do anything special to prepare.  The MRI machine uses a strong magnet. Please wear clothes without metal (snaps, zippers). A sweatsuit works well, or we may give you a hospital gown.   **IMPORTANT** THE INSTRUCTIONS BELOW ARE ONLY FOR THOSE PATIENTS WHO HAVE BEEN PRESCRIBED SEDATION OR GENERAL ANESTHESIA DURING THEIR MRI PROCEDURE:  IF YOUR DOCTOR PRESCRIBED ORAL SEDATION (take medicine to help you relax during your exam):   You must get the medicine from your doctor (oral medication) before you arrive. Bring the medicine to the exam. Do not take it at home. You ll be told when to take it upon arriving for your exam.   Arrive one hour early. Bring someone who can take you home after the test. Your medicine will make you sleepy. After the exam, you may  not drive, take a bus or take a taxi by yourself.  IF YOUR DOCTOR PRESCRIBED IV SEDATION:   Arrive one hour early. Bring someone who can take you home after the test. Your medicine will make you sleepy. After the exam, you may not drive, take a bus or take a taxi by yourself.   No eating 6 hours before your exam. You may have clear liquids up until 4 hours before your exam. (Clear liquids include water, clear tea, black coffee and fruit juice without pulp.)  IF YOUR DOCTOR PRESCRIBED ANESTHESIA (be asleep for your exam):   Arrive 1 1/2 hours early. Bring someone who can take you home after the test. You may not drive, take a bus or take a taxi by yourself.   No eating 8 hours before your exam. You may have clear liquids up until 4 hours before your exam. (Clear liquids include water, clear tea, black coffee and fruit juice without pulp.)   You will spend four to five hours in the recovery room.  Please call the Imaging Department at your exam site with any questions.            Mar 19, 2018 10:50 AM CDT   (Arrive by 10:35 AM)   RETURN KNEE with Jamie Russell MD   [facility] ([facility])    [address] [place] [address] [phone]              Future tests that were ordered for you today     Open Future Orders        Priority Expected Expires Ordered    MR Knee Right w/o Contrast Routine  3/14/2019 3/14/2018            Who to contact     Please call your clinic at [phone] to:    Ask questions about your health    Make or cancel appointments    Discuss your medicines    Learn about your test results    Speak to your doctor            Additional Information About Your Visit        OmegawaveharTheWrap Information     [facility] is an electronic gateway that provides easy, online access to your medical records. With [facility], you can request a clinic appointment, read your test results, renew a prescription or communicate with your care team.    "  To sign up for [employer] visit the website at [email or web address]   You will be asked to enter the access code listed below, as well as some personal information. Please follow the directions to create your username and password.     Your access code is: [email or web address]  Expires: 6/3/2018  7:30 AM     Your access code will  in 90 days. If you need help or a new code, please contact your [facility] or call [phone] for assistance.        Care EveryWhere ID     This is your Care EveryWhere ID. This could be used by other organizations to access your [facility] medical records  [id number]        Your Vitals Were     Height BMI (Body Mass Index)                1.88 m (6' 2\") 23.11 kg/m2           Blood Pressure from Last 3 Encounters:   17 156/64   14 121/58   09/15/14 113/72    Weight from Last 3 Encounters:   18 81.6 kg (180 lb)   17 90.7 kg (200 lb)   17 86.2 kg (190 lb) (87 %)*     * Growth percentiles are based on [facility] 2-20 Years data.               Primary Care Provider    None Specified       No primary provider on file.        Equal Access to Services     JOSSELYN ONTIVEROS : Hadii deny millero Sonorbertoali, waaxda luqadaha, qaybta kaalmada adeegyada, gavin squires . So [facility] [phone].    ATENCIÓN: Si habla español, tiene a vegas disposición servicios gratuitos de asistencia lingüística. betaworks al [phone].    We comply with applicable federal civil rights laws and [place] laws. We do not discriminate on the basis of race, color, national origin, age, disability, sex, sexual orientation, or gender identity.            Thank you!     Thank you for choosing [facility]  for your care. Our goal is always to provide you with excellent care. Hearing back from our patients is one way we can continue to improve our services. Please take a few minutes to complete the written survey that you may receive in the " mail after your visit with us. Thank you!             Your Updated Medication List - Protect others around you: Learn how to safely use, store and throw away your medicines at www.disposemymeds.org.      Notice  As of 3/14/2018 11:49 AM    You have not been prescribed any medications.

## 2018-03-19 ENCOUNTER — OFFICE VISIT (OUTPATIENT)
Dept: ORTHOPEDICS | Facility: CLINIC | Age: 23
End: 2018-03-19
Payer: COMMERCIAL

## 2018-03-19 DIAGNOSIS — M24.661 ARTHROFIBROSIS OF KNEE JOINT, RIGHT: Primary | ICD-10-CM

## 2018-03-19 NOTE — PROGRESS NOTES
DIAGNOSIS:   1.   2. Post ACL arthrofibrosis    PROCEDURES:  1. A 8 months status post BTB ACL reconstruction at outside hospital ([place])    HISTORY:  Nette Conklin is a 22 year old returns in follow-up with MRI from 3/14/2018 of right knee.  Symptoms have not changed since since last visit.  He is here to review options for arthrofibrosis fibrosis of right knee.  We discussed that he is unlikely to improve from further therapy at this time and would be best treated with an arthroscopic lysis of adhesions and manipulation under anesthesia.      Of note, he reports that his father had a lysis of adhesions after his ACL reconstruction.  We also discussed the use of a CPM, he would be agreeable if this is necessary.    EXAM:     General: Awake, Alert, and oriented. Articulates and communicates with a normal affect     Right lower Extremity:    Incisions well healed without evidence of infection    No effusion or ecchymosis noted.    Right knee range of motion from 15-90 .    Stable varus and valgus at 0 and 30 .    1A Lockman intact anterior drawer.    Neurovascularly intact    IMAGING:  Impression:  1. Relatively vertical orientation of the anterior cruciate ligament  reconstruction graft with intact but intermediate signal in its  midportion. The signal alteration may be ongoing ligamentization of  graft rather than pathologic.  2. Interim healing of peripheral body/posterior horn medial meniscus  tear.  3. Interim truncation and signal abnormality of lateral meniscus  involving body and posterior horn, likely related to partial  meniscectomy.  4. Small knee joint effusion with synovitis.   a. Focal synovial thickening posteriorly at medial aspect of knee,  possibly reflecting area of arthrofibrosis.  5. Nonspecific mild subchondral/peripheral marrow edema, nonspecific  but which can be seen in a setting of osteopenia.       ASSESSMENT:  1. 22-year-old male 8 months status post ACL reconstruction, right at  outside hospital  2. Post ACL arthrofibrosis right knee    PLAN:   #1 manipulation under anesthesia right knee  #2 arthroscopic lysis of adhesions right knee    We discussed risks benefits and alternatives to arthroscopic lysis of adhesions.  He appears to understand  well.  We also discussed the use of CPM and immediate therapy after the procedure.  He will proceed with next available scheduling.    Cannot guarantee complete return of normal range of motion.  May require further procedures.  May ultimately require reconstruction of his prior ACL procedure.  Nerve vessel muscle and other soft tissue damage is possible but unlikely.  Risks of anesthesia were discussed.    Seen with Dr. Yvonne Issa MD  03/19/2018      Answers for HPI/ROS submitted by the patient on 3/19/2018   General Symptoms: No  Skin Symptoms: No  HENT Symptoms: No  EYE SYMPTOMS: No  HEART SYMPTOMS: No  LUNG SYMPTOMS: No  INTESTINAL SYMPTOMS: No  URINARY SYMPTOMS: No  REPRODUCTIVE SYMPTOMS: No  SKELETAL SYMPTOMS: No  BLOOD SYMPTOMS: No  NERVOUS SYSTEM SYMPTOMS: No  MENTAL HEALTH SYMPTOMS: No

## 2018-03-19 NOTE — PROGRESS NOTES
Patient seen and examined with the resident.     Assesment: arthrofibrosis following acl reconstruction done at [facility] 8 m ago    Plan: MRI confirms graft intact, though anterior placement of femoral tunnel noted.     Will plan for EUA, KALEY, arthroscopic lysis of adhesions    I discussed with the patient the risks, benefits, complications and techniques of surgery as well as the natural history of arthrofibrosis fllowing acl reconstructions and the alternative treatment options.    The risks include, but are not limited to the risk of death and risk of a myocardial infarction, risk of bleeding and a risk of infection, risk of nerve damage and a risk of muscle damage, stiffness, instability, continued pain or worsening pain and failure to improve motion, need for revision reconstruction surgery.    The patient was provided an opportunity to ask questions and these were answered.    I agree with history, physical and imaging as well as the assessment and plan as detailed by Dr. Issa.

## 2018-03-19 NOTE — LETTER
3/19/2018       RE: Nette Conklin  [address] [place] [address]     Dear Colleague,    Thank you for referring your patient, Nette Conklin, to the [facility] at [facility]. Please see a copy of my visit note below.    DIAGNOSIS:   1.   2. Post ACL arthrofibrosis    PROCEDURES:  1. A 8 months status post BTB ACL reconstruction at outside hospital ([place])    HISTORY:  Nette Conklin is a 22 year old returns in follow-up with MRI from 3/14/2018 of right knee.  Symptoms have not changed since since last visit.  He is here to review options for arthrofibrosis fibrosis of right knee.  We discussed that he is unlikely to improve from further therapy at this time and would be best treated with an arthroscopic lysis of adhesions and manipulation under anesthesia.      Of note, he reports that his father had a lysis of adhesions after his ACL reconstruction.  We also discussed the use of a CPM, he would be agreeable if this is necessary.    EXAM:     General: Awake, Alert, and oriented. Articulates and communicates with a normal affect     Right lower Extremity:    Incisions well healed without evidence of infection    No effusion or ecchymosis noted.    Right knee range of motion from 15-90 .    Stable varus and valgus at 0 and 30 .    1A Lockman intact anterior drawer.    Neurovascularly intact    IMAGING:  Impression:  1. Relatively vertical orientation of the anterior cruciate ligament  reconstruction graft with intact but intermediate signal in its  midportion. The signal alteration may be ongoing ligamentization of  graft rather than pathologic.  2. Interim healing of peripheral body/posterior horn medial meniscus  tear.  3. Interim truncation and signal abnormality of lateral meniscus  involving body and posterior horn, likely related to partial  meniscectomy.  4. Small knee joint effusion with synovitis.   a. Focal synovial thickening  posteriorly at medial aspect of knee,  possibly reflecting area of arthrofibrosis.  5. Nonspecific mild subchondral/peripheral marrow edema, nonspecific  but which can be seen in a setting of osteopenia.       ASSESSMENT:  1. 22-year-old male 8 months status post ACL reconstruction, right at outside hospital  2. Post ACL arthrofibrosis right knee    PLAN:   #1 manipulation under anesthesia right knee  #2 arthroscopic lysis of adhesions right knee    We discussed risks benefits and alternatives to arthroscopic lysis of adhesions.  He appears to understand  well.  We also discussed the use of CPM and immediate therapy after the procedure.  He will proceed with next available scheduling.    Cannot guarantee complete return of normal range of motion.  May require further procedures.  May ultimately require reconstruction of his prior ACL procedure.  Nerve vessel muscle and other soft tissue damage is possible but unlikely.  Risks of anesthesia were discussed.    Seen with Dr. Yvonne Issa MD  03/19/2018      Answers for HPI/ROS submitted by the patient on 3/19/2018   General Symptoms: No  Skin Symptoms: No  HENT Symptoms: No  EYE SYMPTOMS: No  HEART SYMPTOMS: No  LUNG SYMPTOMS: No  INTESTINAL SYMPTOMS: No  URINARY SYMPTOMS: No  REPRODUCTIVE SYMPTOMS: No  SKELETAL SYMPTOMS: No  BLOOD SYMPTOMS: No  NERVOUS SYSTEM SYMPTOMS: No  MENTAL HEALTH SYMPTOMS: No      Patient seen and examined with the resident.     Assesment: arthrofibrosis following acl reconstruction done at OSH 8 m ago    Plan: MRI confirms graft intact, though anterior placement of femoral tunnel noted.     Will plan for EUA, KALEY, arthroscopic lysis of adhesions    I discussed with the patient the risks, benefits, complications and techniques of surgery as well as the natural history of arthrofibrosis fllowing acl reconstructions and the alternative treatment options.    The risks include, but are not limited to the risk of death and risk of a  myocardial infarction, risk of bleeding and a risk of infection, risk of nerve damage and a risk of muscle damage, stiffness, instability, continued pain or worsening pain and failure to improve motion, need for revision reconstruction surgery.    The patient was provided an opportunity to ask questions and these were answered.    I agree with history, physical and imaging as well as the assessment and plan as detailed by Dr. Issa.       Again, thank you for allowing me to participate in the care of your patient.      Sincerely,    Jamie Russell MD

## 2018-03-19 NOTE — MR AVS SNAPSHOT
After Visit Summary   3/19/2018    Nette Conklin    MRN: [id number]           Patient Information     Date Of Birth          1995        Visit Information        Provider Department      3/19/2018 10:50 AM Jamie Russell MD [facility]        Today's Diagnoses     Arthrofibrosis of knee joint, right    -  1       Follow-ups after your visit        Who to contact     Please call your clinic at [phone] to:    Ask questions about your health    Make or cancel appointments    Discuss your medicines    Learn about your test results    Speak to your doctor            Additional Information About Your Visit        MyChart Information     [facility] is an electronic gateway that provides easy, online access to your medical records. With [facility], you can request a clinic appointment, read your test results, renew a prescription or communicate with your care team.     To sign up for Smappot visit the website at [email or web address]   You will be asked to enter the access code listed below, as well as some personal information. Please follow the directions to create your username and password.     Your access code is: [email or web address]  Expires: 6/3/2018  7:30 AM     Your access code will  in 90 days. If you need help or a new code, please contact your [facility] or call [phone] for assistance.        Care EveryWhere ID     This is your Care EveryWhere ID. This could be used by other organizations to access your [place] medical records  [id number]         Blood Pressure from Last 3 Encounters:   17 156/64   14 121/58   09/15/14 113/72    Weight from Last 3 Encounters:   18 81.6 kg (180 lb)   17 90.7 kg (200 lb)   17 86.2 kg (190 lb) (87 %)*     * Growth percentiles are based on CDC 2-20 Years data.              We Performed the Following     Nydia-Operative Worksheet        Primary Care Provider  Office Phone # Fax #    [facility] [phone] [address] [facility] [address]        Equal Access to Services     JOSSELYN ONTIVEROS : Hadii aad ku hadjcarlosmakayla Batsheva, wadamida luqadaha, qaigorta kaalmada kapil, gavin garcia makennamelissa quintero tavia mckenna. So [facility] [phone].    ATENCIÓN: Si habla español, tiene a vegas disposición servicios gratuitos de asistencia lingüística. Llame al [phone].    We comply with applicable federal civil rights laws and [place] laws. We do not discriminate on the basis of race, color, national origin, age, disability, sex, sexual orientation, or gender identity.            Thank you!     Thank you for choosing [facility]  for your care. Our goal is always to provide you with excellent care. Hearing back from our patients is one way we can continue to improve our services. Please take a few minutes to complete the written survey that you may receive in the mail after your visit with us. Thank you!             Your Updated Medication List - Protect others around you: Learn how to safely use, store and throw away your medicines at www.disposemymeds.org.      Notice  As of 3/19/2018 12:23 PM    You have not been prescribed any medications.

## 2018-03-21 NOTE — NURSING NOTE
3/19/18    Teaching Flowsheet   Relevant Diagnosis: Right knee arthrofibrosis  Teaching Topic: Right knee manipulation under anesthesia, arthroscopic lysis of adhesions.     Person(s) involved in teaching:   Patient     Motivation Level:  Asks Questions: Yes  Eager to Learn: Yes  Cooperative: Yes  Receptive (willing/able to accept information): Yes  Any cultural factors/Catholic beliefs that may influence understanding or compliance? No     Patient demonstrates understanding of the following:  Reason for the appointment, diagnosis and treatment plan: Yes  Knowledge of proper use of medications and conditions for which they are ordered (with special attention to potential side effects or drug interactions): Yes  Which situations necessitate calling provider and whom to contact: Yes     Teaching Concerns Addressed: Patient is aware he will need a preoperative H&P within 30 days of date of surgery. Patient understands that he needs to begin physical therapy on day 1 after surgery (order in system). Informed consent signed and scanned in.     Proper use and care of assistive devices. (medical equip, care aids, etc.): Yes  Nutritional needs and diet plan: NA  Pain management techniques: Yes  Wound Care: Yes  How and/when to access community resources: Yes     Instructional Materials Used/Given: Preoperative teaching packet, surgical soap.     Health history negative per patient.    a. Does the patient take five or more prescription medications? No  b. Does patient have difficulty walking up two flights of stairs? No  c. Is patient s BMI 35 or greater? No  d. Is patient an insulin dependent diabetic? No  e. Does patient have a history of having a heart attack or a heart surgery of any kind? No  f. Does patient have a history of heart failure? No  g. Does the patient have a history of any type of transplant? No  h. Does the patient use inhalers on a daily basis? No  i. Does the patient have a history of pulmonary  hypertension? No  Once the patient is evaluated by PAC contact (ex: Surgery schedulers name and number, RN's name and number, etc..);  [phone]  Name of planned surgery______________________________

## 2018-04-05 ENCOUNTER — ANESTHESIA EVENT (OUTPATIENT)
Dept: SURGERY | Facility: AMBULATORY SURGERY CENTER | Age: 23
End: 2018-04-05

## 2018-04-05 NOTE — ANESTHESIA PREPROCEDURE EVALUATION
Anesthesia Evaluation     . Pt has had prior anesthetic. 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 : . . . :. . Previous cardiac testing date:results:date: results:ECG reviewed date: results:2014: Sinus bradycardia (47) date: results:          METS/Exercise Tolerance:  >4 METS   Hematologic:  - neg hematologic  ROS       Musculoskeletal: Comment: Arthrofibrosis of right knee        GI/Hepatic:  - neg GI/hepatic ROS       Renal/Genitourinary:  - ROS Renal section negative       Endo:  - neg endo ROS       Psychiatric:  - neg psychiatric ROS       Infectious Disease:  - neg infectious disease ROS       Malignancy:      - no malignancy   Other:                     Physical Exam  Normal systems: cardiovascular, pulmonary and dental    Airway   Mallampati: II  TM distance: >3 FB  Neck ROM: full    Dental     Cardiovascular   Rhythm and rate: regular and normal      Pulmonary    breath sounds clear to auscultation                     Lab Results   Component Value Date    WBC 5.6 02/25/2013    HGB 12.1 02/25/2013    HGB 14.1 02/25/2013    HCT 41.3 02/25/2013     02/25/2013     02/25/2013    POTASSIUM 3.7 02/25/2013    CHLORIDE 107 02/25/2013     (H) 02/25/2013    DD 0.3 12/06/2014       Anesthesia Plan      History & Physical Review  History and physical reviewed and following examination; no interval change.    ASA Status:  2 .    NPO Status:  > 8 hours    Plan for General and LMA with Intravenous induction. Maintenance will be TIVA.    PONV prophylaxis:  Ondansetron (or other 5HT-3) and Dexamethasone or Solumedrol    Maine Yarbrough MD  [facility] 3 Resident  Pager [phone]      Postoperative Care  Postoperative pain management:  Multi-modal analgesia and Oral pain medications.      Consents  Anesthetic plan,  risks, benefits and alternatives discussed with:  Patient..        I have personally discussed the risks and benefits of anesthesia with the patient and patient agrees to proceed.    Awais Zuleta MD  Anesthesiology                  .

## 2018-04-06 ENCOUNTER — SURGERY (OUTPATIENT)
Age: 23
End: 2018-04-06

## 2018-04-06 ENCOUNTER — ANESTHESIA (OUTPATIENT)
Dept: SURGERY | Facility: AMBULATORY SURGERY CENTER | Age: 23
End: 2018-04-06

## 2018-04-06 ENCOUNTER — HOSPITAL ENCOUNTER (OUTPATIENT)
Facility: AMBULATORY SURGERY CENTER | Age: 23
End: 2018-04-06
Attending: ORTHOPAEDIC SURGERY
Payer: COMMERCIAL

## 2018-04-06 VITALS
SYSTOLIC BLOOD PRESSURE: 132 MMHG | DIASTOLIC BLOOD PRESSURE: 77 MMHG | HEART RATE: 75 BPM | OXYGEN SATURATION: 100 % | TEMPERATURE: 98.4 F | RESPIRATION RATE: 16 BRPM

## 2018-04-06 DIAGNOSIS — Z98.890 STATUS POST KNEE SURGERY: Primary | ICD-10-CM

## 2018-04-06 RX ORDER — CEFAZOLIN SODIUM 1 G/3ML
INJECTION, POWDER, FOR SOLUTION INTRAMUSCULAR; INTRAVENOUS PRN
Status: DISCONTINUED | OUTPATIENT
Start: 2018-04-06 | End: 2018-04-06

## 2018-04-06 RX ORDER — HYDROXYZINE HYDROCHLORIDE 25 MG/1
25 TABLET, FILM COATED ORAL
Status: DISCONTINUED | OUTPATIENT
Start: 2018-04-06 | End: 2018-04-07 | Stop reason: HOSPADM

## 2018-04-06 RX ORDER — GABAPENTIN 300 MG/1
300 CAPSULE ORAL ONCE
Status: COMPLETED | OUTPATIENT
Start: 2018-04-06 | End: 2018-04-06

## 2018-04-06 RX ORDER — AMOXICILLIN 250 MG
1-2 CAPSULE ORAL 2 TIMES DAILY
Qty: 18 TABLET | Refills: 0 | Status: SHIPPED | OUTPATIENT
Start: 2018-04-06

## 2018-04-06 RX ORDER — METHYLPREDNISOLONE 4 MG
TABLET, DOSE PACK ORAL
Qty: 21 TABLET | Refills: 0 | Status: SHIPPED | OUTPATIENT
Start: 2018-04-06

## 2018-04-06 RX ORDER — NALOXONE HYDROCHLORIDE 0.4 MG/ML
.1-.4 INJECTION, SOLUTION INTRAMUSCULAR; INTRAVENOUS; SUBCUTANEOUS
Status: DISCONTINUED | OUTPATIENT
Start: 2018-04-06 | End: 2018-04-07 | Stop reason: HOSPADM

## 2018-04-06 RX ORDER — SODIUM CHLORIDE, SODIUM LACTATE, POTASSIUM CHLORIDE, CALCIUM CHLORIDE 600; 310; 30; 20 MG/100ML; MG/100ML; MG/100ML; MG/100ML
INJECTION, SOLUTION INTRAVENOUS CONTINUOUS
Status: DISCONTINUED | OUTPATIENT
Start: 2018-04-06 | End: 2018-04-07 | Stop reason: HOSPADM

## 2018-04-06 RX ORDER — FENTANYL CITRATE 50 UG/ML
INJECTION, SOLUTION INTRAMUSCULAR; INTRAVENOUS PRN
Status: DISCONTINUED | OUTPATIENT
Start: 2018-04-06 | End: 2018-04-06

## 2018-04-06 RX ORDER — FENTANYL CITRATE 50 UG/ML
25-50 INJECTION, SOLUTION INTRAMUSCULAR; INTRAVENOUS
Status: DISCONTINUED | OUTPATIENT
Start: 2018-04-06 | End: 2018-04-07 | Stop reason: HOSPADM

## 2018-04-06 RX ORDER — SODIUM CHLORIDE, SODIUM LACTATE, POTASSIUM CHLORIDE, CALCIUM CHLORIDE 600; 310; 30; 20 MG/100ML; MG/100ML; MG/100ML; MG/100ML
INJECTION, SOLUTION INTRAVENOUS CONTINUOUS
Status: DISCONTINUED | OUTPATIENT
Start: 2018-04-06 | End: 2018-04-06 | Stop reason: HOSPADM

## 2018-04-06 RX ORDER — HYDROMORPHONE HYDROCHLORIDE 1 MG/ML
.3-.5 INJECTION, SOLUTION INTRAMUSCULAR; INTRAVENOUS; SUBCUTANEOUS EVERY 10 MIN PRN
Status: DISCONTINUED | OUTPATIENT
Start: 2018-04-06 | End: 2018-04-07 | Stop reason: HOSPADM

## 2018-04-06 RX ORDER — OXYCODONE HYDROCHLORIDE 5 MG/1
5 TABLET ORAL EVERY 4 HOURS PRN
Qty: 30 TABLET | Refills: 0 | Status: SHIPPED | OUTPATIENT
Start: 2018-04-06

## 2018-04-06 RX ORDER — ONDANSETRON 4 MG/1
4 TABLET, ORALLY DISINTEGRATING ORAL
Status: DISCONTINUED | OUTPATIENT
Start: 2018-04-06 | End: 2018-04-07 | Stop reason: HOSPADM

## 2018-04-06 RX ORDER — ACETAMINOPHEN 325 MG/1
650 TABLET ORAL EVERY 4 HOURS
Qty: 100 TABLET | Refills: 0 | Status: SHIPPED | OUTPATIENT
Start: 2018-04-06

## 2018-04-06 RX ORDER — LIDOCAINE 40 MG/G
CREAM TOPICAL
Status: DISCONTINUED | OUTPATIENT
Start: 2018-04-06 | End: 2018-04-06 | Stop reason: HOSPADM

## 2018-04-06 RX ORDER — OXYCODONE HYDROCHLORIDE 5 MG/1
5 TABLET ORAL EVERY 4 HOURS PRN
Status: DISCONTINUED | OUTPATIENT
Start: 2018-04-06 | End: 2018-04-07 | Stop reason: HOSPADM

## 2018-04-06 RX ORDER — BUPIVACAINE HYDROCHLORIDE AND EPINEPHRINE 2.5; 5 MG/ML; UG/ML
INJECTION, SOLUTION INFILTRATION; PERINEURAL PRN
Status: DISCONTINUED | OUTPATIENT
Start: 2018-04-06 | End: 2018-04-06 | Stop reason: HOSPADM

## 2018-04-06 RX ORDER — MEPERIDINE HYDROCHLORIDE 25 MG/ML
12.5 INJECTION INTRAMUSCULAR; INTRAVENOUS; SUBCUTANEOUS
Status: DISCONTINUED | OUTPATIENT
Start: 2018-04-06 | End: 2018-04-07 | Stop reason: HOSPADM

## 2018-04-06 RX ORDER — LIDOCAINE HYDROCHLORIDE 20 MG/ML
INJECTION, SOLUTION INFILTRATION; PERINEURAL PRN
Status: DISCONTINUED | OUTPATIENT
Start: 2018-04-06 | End: 2018-04-06

## 2018-04-06 RX ORDER — DEXAMETHASONE SODIUM PHOSPHATE 4 MG/ML
INJECTION, SOLUTION INTRA-ARTICULAR; INTRALESIONAL; INTRAMUSCULAR; INTRAVENOUS; SOFT TISSUE PRN
Status: DISCONTINUED | OUTPATIENT
Start: 2018-04-06 | End: 2018-04-06

## 2018-04-06 RX ORDER — DEXAMETHASONE SODIUM PHOSPHATE 4 MG/ML
4 INJECTION, SOLUTION INTRA-ARTICULAR; INTRALESIONAL; INTRAMUSCULAR; INTRAVENOUS; SOFT TISSUE EVERY 10 MIN PRN
Status: DISCONTINUED | OUTPATIENT
Start: 2018-04-06 | End: 2018-04-07 | Stop reason: HOSPADM

## 2018-04-06 RX ORDER — GLYCOPYRROLATE 0.2 MG/ML
INJECTION, SOLUTION INTRAMUSCULAR; INTRAVENOUS PRN
Status: DISCONTINUED | OUTPATIENT
Start: 2018-04-06 | End: 2018-04-06

## 2018-04-06 RX ORDER — ONDANSETRON 2 MG/ML
4 INJECTION INTRAMUSCULAR; INTRAVENOUS EVERY 30 MIN PRN
Status: DISCONTINUED | OUTPATIENT
Start: 2018-04-06 | End: 2018-04-07 | Stop reason: HOSPADM

## 2018-04-06 RX ORDER — ALBUTEROL SULFATE 0.83 MG/ML
2.5 SOLUTION RESPIRATORY (INHALATION) EVERY 4 HOURS PRN
Status: DISCONTINUED | OUTPATIENT
Start: 2018-04-06 | End: 2018-04-06 | Stop reason: HOSPADM

## 2018-04-06 RX ORDER — HYDROXYZINE HYDROCHLORIDE 25 MG/1
25 TABLET, FILM COATED ORAL EVERY 6 HOURS PRN
Qty: 20 TABLET | Refills: 0 | Status: SHIPPED | OUTPATIENT
Start: 2018-04-06

## 2018-04-06 RX ORDER — SODIUM CHLORIDE, SODIUM LACTATE, POTASSIUM CHLORIDE, CALCIUM CHLORIDE 600; 310; 30; 20 MG/100ML; MG/100ML; MG/100ML; MG/100ML
INJECTION, SOLUTION INTRAVENOUS CONTINUOUS PRN
Status: DISCONTINUED | OUTPATIENT
Start: 2018-04-06 | End: 2018-04-06

## 2018-04-06 RX ORDER — ONDANSETRON 4 MG/1
4 TABLET, ORALLY DISINTEGRATING ORAL EVERY 30 MIN PRN
Status: DISCONTINUED | OUTPATIENT
Start: 2018-04-06 | End: 2018-04-07 | Stop reason: HOSPADM

## 2018-04-06 RX ORDER — ACETAMINOPHEN 325 MG/1
975 TABLET ORAL ONCE
Status: COMPLETED | OUTPATIENT
Start: 2018-04-06 | End: 2018-04-06

## 2018-04-06 RX ORDER — PROPOFOL 10 MG/ML
INJECTION, EMULSION INTRAVENOUS PRN
Status: DISCONTINUED | OUTPATIENT
Start: 2018-04-06 | End: 2018-04-06

## 2018-04-06 RX ORDER — ONDANSETRON 2 MG/ML
INJECTION INTRAMUSCULAR; INTRAVENOUS PRN
Status: DISCONTINUED | OUTPATIENT
Start: 2018-04-06 | End: 2018-04-06

## 2018-04-06 RX ORDER — KETOROLAC TROMETHAMINE 30 MG/ML
INJECTION, SOLUTION INTRAMUSCULAR; INTRAVENOUS PRN
Status: DISCONTINUED | OUTPATIENT
Start: 2018-04-06 | End: 2018-04-06

## 2018-04-06 RX ORDER — FENTANYL CITRATE 50 UG/ML
25-50 INJECTION, SOLUTION INTRAMUSCULAR; INTRAVENOUS
Status: DISCONTINUED | OUTPATIENT
Start: 2018-04-06 | End: 2018-04-06 | Stop reason: HOSPADM

## 2018-04-06 RX ADMIN — OXYCODONE HYDROCHLORIDE 5 MG: 5 TABLET ORAL at 08:52

## 2018-04-06 RX ADMIN — ACETAMINOPHEN 975 MG: 325 TABLET ORAL at 06:48

## 2018-04-06 RX ADMIN — FENTANYL CITRATE 50 MCG: 50 INJECTION, SOLUTION INTRAMUSCULAR; INTRAVENOUS at 08:16

## 2018-04-06 RX ADMIN — KETOROLAC TROMETHAMINE 30 MG: 30 INJECTION, SOLUTION INTRAMUSCULAR; INTRAVENOUS at 08:12

## 2018-04-06 RX ADMIN — PROPOFOL 280 MG: 10 INJECTION, EMULSION INTRAVENOUS at 07:27

## 2018-04-06 RX ADMIN — DEXAMETHASONE SODIUM PHOSPHATE 4 MG: 4 INJECTION, SOLUTION INTRA-ARTICULAR; INTRALESIONAL; INTRAMUSCULAR; INTRAVENOUS; SOFT TISSUE at 07:27

## 2018-04-06 RX ADMIN — FENTANYL CITRATE 50 MCG: 50 INJECTION, SOLUTION INTRAMUSCULAR; INTRAVENOUS at 07:22

## 2018-04-06 RX ADMIN — FENTANYL CITRATE 50 MCG: 50 INJECTION, SOLUTION INTRAMUSCULAR; INTRAVENOUS at 07:27

## 2018-04-06 RX ADMIN — GLYCOPYRROLATE 0.2 MG: 0.2 INJECTION, SOLUTION INTRAMUSCULAR; INTRAVENOUS at 07:25

## 2018-04-06 RX ADMIN — SODIUM CHLORIDE, SODIUM LACTATE, POTASSIUM CHLORIDE, CALCIUM CHLORIDE: 600; 310; 30; 20 INJECTION, SOLUTION INTRAVENOUS at 07:22

## 2018-04-06 RX ADMIN — GABAPENTIN 300 MG: 300 CAPSULE ORAL at 06:48

## 2018-04-06 RX ADMIN — CEFAZOLIN SODIUM 2 G: 1 INJECTION, POWDER, FOR SOLUTION INTRAMUSCULAR; INTRAVENOUS at 07:30

## 2018-04-06 RX ADMIN — Medication 0.5 MG: at 07:59

## 2018-04-06 RX ADMIN — ONDANSETRON 4 MG: 2 INJECTION INTRAMUSCULAR; INTRAVENOUS at 08:11

## 2018-04-06 RX ADMIN — FENTANYL CITRATE 50 MCG: 50 INJECTION, SOLUTION INTRAMUSCULAR; INTRAVENOUS at 08:13

## 2018-04-06 RX ADMIN — LIDOCAINE HYDROCHLORIDE 100 MG: 20 INJECTION, SOLUTION INFILTRATION; PERINEURAL at 07:27

## 2018-04-06 RX ADMIN — BUPIVACAINE HYDROCHLORIDE AND EPINEPHRINE 20 ML: 2.5; 5 INJECTION, SOLUTION INFILTRATION; PERINEURAL at 07:51

## 2018-04-06 NOTE — ANESTHESIA CARE TRANSFER NOTE
Patient: Nette Conklin    Procedure(s):  Exam Under Anesthesia Right Knee, Right Knee Arthroscopy, Arthrofibrosis Manipulation Under Anesthesia, Lysis of adhesions - Wound Class: I-Clean    Diagnosis: Right Arthrofibrosis Of Knee Joint  Diagnosis Additional Information: No value filed.    Anesthesia Type:   General, LMA     Note:  Airway :Nasal Cannula  Patient transferred to:PACU  Comments: Arrive PACU, Stable, Airway Intact  136/80, 98,16,100%,97.3  All questions answered.  Handoff Report: Identifed the Patient, Identified the Reponsible Provider, Reviewed the pertinent medical history, Discussed the surgical course, Reviewed Intra-OP anesthesia mangement and issues during anesthesia, Set expectations for post-procedure period and Allowed opportunity for questions and acknowledgement of understanding      Vitals: (Last set prior to Anesthesia Care Transfer)    CRNA VITALS  4/6/2018 0803 - 4/6/2018 0835      4/6/2018             Pulse: 109    SpO2: 100 %    Resp Rate (observed): 21    Resp Rate (set): 10                Electronically Signed By: DANNI Cota CRNA  April 6, 2018  8:35 AM

## 2018-04-06 NOTE — BRIEF OP NOTE
Orthopaedic Surgery Brief Op-Note     Patient: Nette Conklin  : 1995  Date of Service: 2018 8:06 AM    Preoperative Diagnosis: Right Arthrofibrosis Of Knee Joint     Postoperative Diagnosis: same    Procedure: Procedure(s):  Exam Under Anesthesia Right Knee, Right Knee Arthroscopy, Arthrofibrosis Manipulation Under Anesthesia  - Wound Class: I-Clean    Surgeon: Dr. Russell    Assistants:  Gianni Issa MD    Anesthesia: General     Estimated Blood Loss: 5 cc    Specimen: none       Complications: none    Condition: stable    Findings: please see dictated operative note    Plan:    WBAT    CPM: 70 degrees to flexion tolerance with goal of 120 degrees. Advance aggressively in increments of 5 degrees every 30 minutes until goal is achieved    PT as outpatient    ADAT    Pain control with orals prn    Medrol dose pack to be taken as prescribed    Bowel prophylaxis with senna-docusate    DVT prophylaxis: mechanical only     Disposition: patient may be discharged with  once appropriate discharge criteria have been met    Herbert Hermosillo PA-C  Orthopaedic Surgery    Please page me at [phone] with any questions/concerns. If there is no response, if it is a weekend, or if it is during evening hours, please page the orthopaedic surgery resident on call.

## 2018-04-06 NOTE — OP NOTE
PREOPERATIVE DIAGNOSIS:   1. Arthrofibrosis right knee  2. Status post ACL reconstruction done at an outside hospital 8 months ago    POSTOPERATIVE DIAGNOSIS:  1. Arthrofibrosis right knee preoperative range of motion 5-80  2. Lateral meniscus tear  3. Intact ACL graft    PROCEDURE:  1. Examination under anesthesia right knee  2. Right knee arthroscopy  3. Arthroscopic lysis of adhesions  4. Extensive synovectomy anterior chamber, medial gutter, lateral gutter, suprapatellar pouch  5. Partial lateral menisci  6. Debridement of cyclops lesion surrounding ACL graft    DATE OF SURGERY: 4/6/2018    SURGEON: Jamie Russell MD    ASSISTANT: None.     RESIDENT OR FELLOW: Junaid Issa MD     OPERATIVE INDICATIONS: Nette Conklin is a pleasant 22 year old male who I saw through my orthopedic clinic with a history, physical, imaging consistent with right.  I reviewed with the patient the risks, benefits, complications, techniques and alternatives to surgery.  We reviewed the expected course of recovery and the potential expected outcomes.  The patient understood both the risks and benefits and desired to proceed despite the risks.    OPERATIVE DETAILS: In the preoperative area the patient's informed consent was reviewed and they desired to proceed.  The right leg was marked and the patient was in agreement.  The patient was taken to the operating room where a timeout was performed and all parties were in agreement.  Preoperative antibiotics were given within 1 hour of the time of incision.  The patient was placed in the supine position and surrendered to LMA anesthesia.  No tourniquet was applied.  Egg crate was placed beneath the well leg and a side post was utilized.  The operative leg was prepped and draped in the usual sterile fashion.      Examination under anesthesia:  His preoperative range of motion as measured with a goniometer was 5-80  with the patient asleep in the operating room.  His ACL graft was stable.   Stable to varus and valgus stress testing.  1 quadrant medial and lateral translation of the patella.    Anterior medial and anterior lateral arthroscopic portals were created and diagnostic arthroscopy was performed the following findings: Massive scar tissue within the suprapatellar pouch, anterior chamber medial lateral gutter.  Medial femoral condyle, medial tibial plateau was largely intact.  Lateral femoral condyle lateral tibial plateau was intact.  ACL graft was intact though surrounded by scar tissue and extensive cyclops lesion.  Grade 1-2 softening and fissuring, respectively of the trochlea patellar cartilage was intact massive scar tissue within the suprapatellar pouch.    At this time an extensive synovectomy and debridement of scar tissue and arthroscopic lysis of adhesions was performed with the suprapatellar pouch, medial gutter, lateral gutter as well as the anterior chamber of the knee.  At this time a partial lateral meniscectomy was completed until a balanced stable rim of cartilage remained.  At this time a graft plasty/cyclops debridement of the ACL graft was performed.    At this time a manipulation under anesthesia was completed and her range of motion was from 1  to 135 .  This match the contralateral side.    Copious irrigation was performed an a layered closure was initiated, sterile dressings were applied and the patient was transferred to the recovery room in stable condition with stable vital signs.    ESTIMATED BLOOD LOSS: 5 mL.    TOURNIQUET TIME: No tourniquet was placed.    COMPLICATIONS: None apparent.    DRAINS: None.    SPECIMENS: None.     POSTOPERATIVE PLAN:  1. Weightbearing as tolerated  2. Range of motion as tolerated  3. No brace  4. CPM machine 0 to full advance as tolerated no restrictions  5. Medrol Dosepak to start tomorrow  6. Pain meds be provided  7. Therapy within the next 3 days  8. Follow-up 1 week

## 2018-04-06 NOTE — ANESTHESIA POSTPROCEDURE EVALUATION
Patient: Nette Conklin    Procedure(s):  Exam Under Anesthesia Right Knee, Right Knee Arthroscopy, Arthrofibrosis Manipulation Under Anesthesia, Lysis of adhesions - Wound Class: I-Clean    Diagnosis:Right Arthrofibrosis Of Knee Joint  Diagnosis Additional Information: No value filed.    Anesthesia Type:  General, LMA    Note:  Anesthesia Post Evaluation    Patient location during evaluation: PACU  Patient participation: Able to fully participate in evaluation  Level of consciousness: awake  Pain management: adequate  Airway patency: patent  Cardiovascular status: acceptable  Respiratory status: acceptable  Hydration status: acceptable  PONV: none             Last vitals:  Vitals:    04/06/18 0901 04/06/18 0916 04/06/18 0946   BP: 127/79 114/72 132/77   Pulse: 83 87 75   Resp: 17 16 16   Temp: 36.5  C (97.7  F) 36.5  C (97.7  F) 36.9  C (98.4  F)   SpO2: 98% 100% 100%         Electronically Signed By: Awais Zuleta MD  April 6, 2018  10:15 AM

## 2018-04-06 NOTE — IP AVS SNAPSHOT
[facility]    [address] [place] [address]    Phone:  [phone]    Fax:  [phone]                                       After Visit Summary   4/6/2018    Nette Conklin    MRN: [id number]           After Visit Summary Signature Page     I have received my discharge instructions, and my questions have been answered. I have discussed any challenges I see with this plan with the nurse or doctor.    ..........................................................................................................................................  Patient/Patient Representative Signature      ..........................................................................................................................................  Patient Representative Print Name and Relationship to Patient    ..................................................               ................................................  Date                                            Time    ..........................................................................................................................................  Reviewed by Signature/Title    ...................................................              ..............................................  Date                                                            Time

## 2018-04-06 NOTE — OR NURSING
[facility]  [facility]  [address]  [address] [facility] [address]  [phone]  [phone]    2018    Nette Conklin  [address] [place] [address]  [phone] (home)     :  1995    To Whom it May Concern:    Nette Conklin missed work on  due to surgery. His restrictions are as follows:  Use crutches for comfort, weight bearing status as tolerated.  No driving for 6 weeks.  No running, swimming, sports, etc for 6 weeks.   Activity as tolerated.      Please contact me for any questions or concerns.    Sincerely,      Dr. Jamie Russell MD  [phone]  Via Phuong Mcguire RN

## 2018-04-06 NOTE — DISCHARGE INSTRUCTIONS
[facility]  Home Care Following Anesthesia  For 24 hours after surgery:  1. Get plenty of rest.  A responsible adult must stay with you for at least 24 hours after you leave the surgery center.  2. Do not drive or use heavy equipment.  If you have weakness or tingling, don't drive or use heavy equipment until this feeling goes away.   3. Do not drink alcohol.   4. Avoid strenuous or risky activities.  Ask for help when climbing stairs.  5. You may feel lightheaded.  IF so, sit for a few minutes before standing.  Have someone help you get up.   6. If you have nausea (feel sick to your stomach): Drink only clear liquids such as apple juice, ginger ale, broth or 7-Up.  Rest may also help.  Be sure to drink enough fluids.  Move to a regular diet as you feel able.   7. You may have a slight fever.  Call the doctor if your fever is over 100 F (37.7 C) (taken under the tongue) or lasts longer than 24 hours.  8. You may have a dry mouth, a sore throat, muscle aches or trouble sleeping. These should go away after 24 hours.  9. Do not make important or legal decisions.               Tips for taking pain medications  To get the best pain relief possible, remember these points:    Take pain medications as directed, before pain becomes severe.    Pain medication can upset your stomach: taking it with food may help.    Constipation is a common side effect of pain medication. Drink plenty of  fluids.    Eat foods high in fiber. Take a stool softener if recommended by your doctor or pharmacist.    Do not drink alcohol, drive or operate machinery while taking pain medications.    Ask about other ways to control pain, such as with heat, ice or relaxation.    Tylenol/Acetaminophen Consumption  To help encourage the safe use of acetaminophen, the makers of TYLENOL  have lowered the maximum daily dose for single-ingredient Extra Strength TYLENOL  (acetaminophen) products sold in the [place] from 8  pills per day (4,000 mg) to 6 pills per day (3,000 mg). The dosing interval has also changed from 2 pills every 4-6 hours to 2 pills every 6 hours.    If you feel your pain relief is insufficient, you may take Tylenol/Acetaminophen in addition to your narcotic pain medication.     Be careful not to exceed 3,000 mg of Tylenol/Acetaminophen in a 24 hour period from all sources.    If you are taking extra strength Tylenol/acetaminophen (500 mg), the maximum dose is 6 tablets in 24 hours.    If you are taking regular strength acetaminophen (325 mg), the maximum dose is 9 tablets in 24 hours.    Call a doctor for any of the followin. Signs of infection (fever, growing tenderness at the surgery site, a large amount of drainage or bleeding, severe pain, foul-smelling drainage, redness, swelling).  2. It has been over 8 to 10 hours since surgery and you are still not able to urinate (pass water).  3. Headache for over 24 hours.  4. Numbness, tingling or weakness the day after surgery (if you had spinal anesthesia).  Your doctor is:       Dr. Jamie Russell, Orthopaedics: [phone]               Or dial [phone] and ask for the resident on call for:  Orthopaedics  For emergency care, call the:  [facility] Emergency Department: [phone] (TTY for hearing impaired: [phone])  Safety Tips for Using Crutches    Crutch Fit:    Assume good standing posture with shoulders relaxed and crutch tips 6-8 inches out from the side of the foot.    The underarm pad should fall 2-3 fingers width below the armpit.    The handgrip is positioned level with the wrist to allow 30  flexion at the elbow.    Safety Tips:    Bear weight on your hands, not on your armpits.    Do not add extra padding to the underarm pad. This will, in effect, lengthen the crutches and increase risk of nerve injury.    Wear flat, properly fitting shoes. Do not walk in stocking feet, high heels or slippers.    Household hazards:  --Throw rugs should  "be removed from floors.  --Stairs should be cleared of obstacles.  --Use extra caution on slippery, highly polished, littered or uneven floor surfaces.  --Check for electric cords.    Check crutch tips for excessive wear and keep wing nuts tight.    While walking, look forward with  head up  and  eyes open.  Take equal length steps.    Use BOTH crutches.    Stairs Sequence:    UP: \"Good\" leg first, followed by  bad  leg, then crutches.    DOWN: Crutches, followed by  bad  leg, \"good\" leg.     Walking with Crutches:    Move both crutches forward at the same time.    Non-Weight Bearing (NWB):  Hold the involved leg up and swing through the crutches with the involved leg. The involved leg does not touch the floor.    Toe Touch Weight Bearing (TTWB): Move the involved leg forward. Rest it lightly on the floor for balance only. Step through the crutches with the uninvolved leg.    Partial Weight Bearing (PWB): Move the involved leg forward. Step down the weight of the leg only.  Step through the crutches with the uninvolved leg.    Weight Bearing As Tolerated (WBAT): Move the involved leg forward. Put as much pressure through the involved leg as you can tolerate comfortably. Then step through the crutches with the uninvolved leg.                "

## 2018-04-06 NOTE — IP AVS SNAPSHOT
MRN:[id number]                      After Visit Summary   4/6/2018    Nette Conklin    MRN: [id number]           Thank you!     Thank you for choosing [facility] for your care. Our goal is always to provide you with excellent care. Hearing back from our patients is one way we can continue to improve our services. Please take a few minutes to complete the written survey that you may receive in the mail after you visit with us. Thank you!        Patient Information     Date Of Birth          1995        About your hospital stay     You were admitted on:  April 6, 2018 [facility]    You were discharged on:  April 6, 2018       Who to Call     For medical emergencies, please call 911.  For non-urgent questions about your medical care, please call your primary care provider or clinic, [phone]  For questions related to your surgery, please call your surgery clinic        Attending Provider     Provider Specialty    Jamie Russell MD Orthopedics       Primary Care Provider Office Phone # Fax #    [facility] [phone]      After Care Instructions      Diet as Tolerated       Return to diet before surgery, unless instructed otherwise.            CPM machine       Patient to obtain CPM machine            Discharge Instructions       POST OPERATIVE INSTRUCTIONS    Weight bearing status: Weight bearing as tolerated     Sling/brace: none    Dispense crutches and teach crutch walking    Exercises to teach and start now: None    Physical therapy: First physical therapy appointment should be made within the first week after your operation. The frequency of appointments will be determined by your physical therapist.    Dressing change: The first dressing change should be performed on post-op day #3. After that time, it is okay to shower as long as you do not submerge your surgical wound.     Return  to clinic: 1 week post-op     Clinic number for questions or concerns: [phone]    A.  COMFORT:  Elevation: Elevate your knee and ankle above the level of your heart.  The best position is lying down with two pillows lengthwise under your entire leg.  This should be done for the first several days after surgery.  To the extent possible, you should NOT place a pillow under your knee, as we want to encourage full extension at the knee.  Swelling: An ice pack will be provided to control swelling and discomfort.  Once the initial dressing is removed, place a thin towel between your skin and the ice pack.  Pain Medication: Take medication as prescribed, but only as often as necessary.  Avoid alcohol and driving if you are taking pain medication.     Medrol Dose pack: A course of corticosteroids will be prescribed after surgery. Take medication as instructed.  Driving: Driving is NOT permitted for 6 weeks following right knee surgery.     B.  ACTIVITIES:  -Range of Motion: ROM about your knee as tolerated    -Exercises: Point and flex your feet, move your ankle around in big circles, and wiggle your toes (or write the alphabet with your big toe) to help prevent complications such as blood clotting in your legs. Thigh muscle tightening exercises should begin the day of surgery and should be done for 10 to 15 minutes, 3 times a day, for the first few weeks after surgery.  Straight leg raising can be done starting the day after surgery.  -Athletic activities: Athletic activities, such as swimming, bicycling, jogging, running and stop-and-go-sports should be avoided until 6 weeks postop.  -Return to Work: Return to work as soon as possible.  Your ability to work depends on a number of factors - your level of discomfort and how much demand your job puts on your knees.  If you have any questions, please call your doctor.  -CPM: set at 70 degrees to flexion tolerance with a goal of 120 degrees flexion. Advance aggressively  in increments of 5 degrees every 30 minutes until goal of 120 degrees is achieved. Perform CPM exercises for 6-8 hours per day.     C. INCISION CARE:    -Keep the initial post-op dressing on, clean and dry for the first 3 days after surgery. You may then remove the dressing and shower.  -The steri-strips (small white tape that is directly on the incision areas) should be left on until they fall off or are removed at your first office visit. You may apply band-aids directly to the small incisions around your knee after showering.   -Tub bathing, swimming, and soaking of the knee should be avoided until allowed by your doctor     D. CALL YOUR PHYSICIAN IF:  -Pain in your knee persists or worsens in the first few days after surgery.  -Excessive redness or drainage of cloudy or bloody material from the wounds (clear red tinted fluid and some mild drainage should be expected). Drainage of any kind 5 days after surgery should be reported to the doctor.  -You have a temperature elevation greater than 101.5    -You have pain, swelling or redness in your calf.  -You have numbness or weakness in your leg or foot.    E.  RETURN TO THE OFFICE:  Your first return to our office should be approximately one week after your surgery.  Call your physician's office to make an appointment for this first post-operative visit if not already scheduled.            Follow-up Physical Therapy appointment       Therapy will be coordinate through clinic at your first followup visit. If you know that you are scheduled to start PT, it is ok to start prior to your first postop visit at approximately 1 week.            Ice to affected area       Ice pack to surgical site every 15 minutes per hour for 24 hours            No driving or operating machinery       While taking narcotics. OK to drive when off narcotics and patient is safe to drive.            Remove dressing - at 72 hours       Reapply bandaids if needed, ok to leave uncovered. Leave  steristrips in place.            Return to clinic       F/u in clinic in 1-2 weeks as previously scheduled. If your followup appointment is not scheduled please call [phone].            Shower       OK to shower postop day 3, ok to get wet, no submerging wounds in a bath or pool.            Weight bearing - As tolerated                 Your next 10 appointments already scheduled     Apr 09, 2018  2:00 PM CDT   (Arrive by 1:45 PM)   TOD Extremity with Brigido Escobedo PT   [facility] TOD ([facility])    [address] [place] [address] [address] [facility] [address] [phone]            Apr 16, 2018 10:20 AM CDT   (Arrive by 10:05 AM)   RETURN KNEE with Jamie Russell MD   [facility] ([facility])    [address] [place] [address]  4th [facility] [address] [phone]              Further instructions from your care team       [facility]  Home Care Following Anesthesia  For 24 hours after surgery:  1. Get plenty of rest.  A responsible adult must stay with you for at least 24 hours after you leave the surgery center.  2. Do not drive or use heavy equipment.  If you have weakness or tingling, don't drive or use heavy equipment until this feeling goes away.   3. Do not drink alcohol.   4. Avoid strenuous or risky activities.  Ask for help when climbing stairs.  5. You may feel lightheaded.  IF so, sit for a few minutes before standing.  Have someone help you get up.   6. If you have nausea (feel sick to your stomach): Drink only clear liquids such as apple juice, ginger ale, broth or 7-Up.  Rest may also help.  Be sure to drink enough fluids.  Move to a regular diet as you feel able.   7. You may have a slight fever.  Call the doctor if your fever is over 100 F (37.7 C) (taken under the tongue) or lasts longer than 24 hours.  8. You may have a dry mouth, a sore throat,  muscle aches or trouble sleeping. These should go away after 24 hours.  9. Do not make important or legal decisions.               Tips for taking pain medications  To get the best pain relief possible, remember these points:    Take pain medications as directed, before pain becomes severe.    Pain medication can upset your stomach: taking it with food may help.    Constipation is a common side effect of pain medication. Drink plenty of  fluids.    Eat foods high in fiber. Take a stool softener if recommended by your doctor or pharmacist.    Do not drink alcohol, drive or operate machinery while taking pain medications.    Ask about other ways to control pain, such as with heat, ice or relaxation.    Tylenol/Acetaminophen Consumption  To help encourage the safe use of acetaminophen, the makers of TYLENOL  have lowered the maximum daily dose for single-ingredient Extra Strength TYLENOL  (acetaminophen) products sold in the [place] from 8 pills per day (4,000 mg) to 6 pills per day (3,000 mg). The dosing interval has also changed from 2 pills every 4-6 hours to 2 pills every 6 hours.    If you feel your pain relief is insufficient, you may take Tylenol/Acetaminophen in addition to your narcotic pain medication.     Be careful not to exceed 3,000 mg of Tylenol/Acetaminophen in a 24 hour period from all sources.    If you are taking extra strength Tylenol/acetaminophen (500 mg), the maximum dose is 6 tablets in 24 hours.    If you are taking regular strength acetaminophen (325 mg), the maximum dose is 9 tablets in 24 hours.    Call a doctor for any of the followin. Signs of infection (fever, growing tenderness at the surgery site, a large amount of drainage or bleeding, severe pain, foul-smelling drainage, redness, swelling).  2. It has been over 8 to 10 hours since surgery and you are still not able to urinate (pass water).  3. Headache for over 24 hours.  4. Numbness, tingling or weakness the day after surgery (if  "you had spinal anesthesia).  Your doctor is:       Dr. Jamie Russell, Orthopaedics: [phone]               Or dial [phone] and ask for the resident on call for:  Orthopaedics  For emergency care, call the:  [facility] Emergency Department: [phone] (TTY for hearing impaired: [phone])  Safety Tips for Using Crutches    Crutch Fit:    Assume good standing posture with shoulders relaxed and crutch tips 6-8 inches out from the side of the foot.    The underarm pad should fall 2-3 fingers width below the armpit.    The handgrip is positioned level with the wrist to allow 30  flexion at the elbow.    Safety Tips:    Bear weight on your hands, not on your armpits.    Do not add extra padding to the underarm pad. This will, in effect, lengthen the crutches and increase risk of nerve injury.    Wear flat, properly fitting shoes. Do not walk in stocking feet, high heels or slippers.    Household hazards:  --Throw rugs should be removed from floors.  --Stairs should be cleared of obstacles.  --Use extra caution on slippery, highly polished, littered or uneven floor surfaces.  --Check for electric cords.    Check crutch tips for excessive wear and keep wing nuts tight.    While walking, look forward with  head up  and  eyes open.  Take equal length steps.    Use BOTH crutches.    Stairs Sequence:    UP: \"Good\" leg first, followed by  bad  leg, then crutches.    DOWN: Crutches, followed by  bad  leg, \"good\" leg.     Walking with Crutches:    Move both crutches forward at the same time.    Non-Weight Bearing (NWB):  Hold the involved leg up and swing through the crutches with the involved leg. The involved leg does not touch the floor.    Toe Touch Weight Bearing (TTWB): Move the involved leg forward. Rest it lightly on the floor for balance only. Step through the crutches with the uninvolved leg.    Partial Weight Bearing (PWB): Move the involved leg forward. Step down the weight of the leg only.  Step " through the crutches with the uninvolved leg.    Weight Bearing As Tolerated (WBAT): Move the involved leg forward. Put as much pressure through the involved leg as you can tolerate comfortably. Then step through the crutches with the uninvolved leg.                  Pending Results     No orders found from 2018 to 2018.            Admission Information     Date & Time Provider Department Dept. Phone    2018 Jamie Russell MD [facility] [phone]      Your Vitals Were     Blood Pressure Pulse Temperature Respirations Pulse Oximetry       136/80 74 97.3  F (36.3  C) (Temporal) 16 100%       MyChart Information     [employer] is an electronic gateway that provides easy, online access to your medical records. With [employer], you can request a clinic appointment, read your test results, renew a prescription or communicate with your care team.     To sign up for [employer] visit the website at [email or web address]   You will be asked to enter the access code listed below, as well as some personal information. Please follow the directions to create your username and password.     Your access code is: [email or web address]  Expires: 6/3/2018  7:30 AM     Your access code will  in 90 days. If you need help or a new code, please contact your [facility] or call [phone] for assistance.        Care EveryWhere ID     This is your Care EveryWhere ID. This could be used by other organizations to access your [facility] medical records  [id number]        Equal Access to Services     JOSSELYN NOTIVEROS : Hadii deny ku hadasho Soomaali, waaxda luqadaha, qaybta kaalmada adeegyada, gavin squires . So [facility] [phone].    ATENCIÓN: Si habla español, tiene a vegas disposición servicios gratuitos de asistencia lingüística. Llame al [phone].    We comply with applicable federal civil rights laws and [place] laws. We do not  discriminate on the basis of race, color, national origin, age, disability, sex, sexual orientation, or gender identity.               Review of your medicines      START taking        Dose / Directions    acetaminophen 325 MG tablet   Commonly known as:  TYLENOL   Used for:  Status post knee surgery        Dose:  650 mg   Take 2 tablets (650 mg) by mouth every 4 hours   Quantity:  100 tablet   Refills:  0       hydrOXYzine 25 MG tablet   Commonly known as:  ATARAX   Used for:  Status post knee surgery        Dose:  25 mg   Take 1 tablet (25 mg) by mouth every 6 hours as needed for itching (and nausea)   Quantity:  20 tablet   Refills:  0       methylPREDNISolone 4 MG tablet   Commonly known as:  MEDROL DOSEPAK   Used for:  Status post knee surgery        Follow package instructions   Quantity:  21 tablet   Refills:  0       oxyCODONE IR 5 MG tablet   Commonly known as:  ROXICODONE   Used for:  Status post knee surgery        Dose:  5 mg   Take 1 tablet (5 mg) by mouth every 4 hours as needed for pain or other (Moderate to Severe)   Quantity:  30 tablet   Refills:  0       senna-docusate 8.6-50 MG per tablet   Commonly known as:  SENOKOT-S;PERICOLACE   Used for:  Status post knee surgery        Dose:  1-2 tablet   Take 1-2 tablets by mouth 2 times daily Take while on oral narcotics to prevent or treat constipation.   Quantity:  18 tablet   Refills:  0            Where to get your medicines      These medications were sent to [address] [address]    Hours:  TRANSPLANT PHONE NUMBER [phone] Phone:  [phone]     acetaminophen 325 MG tablet    hydrOXYzine 25 MG tablet    methylPREDNISolone 4 MG tablet    senna-docusate 8.6-50 MG per tablet         Some of these will need a paper prescription and others can be bought over the counter. Ask your nurse if you have questions.     Bring a paper prescription  for each of these medications     oxyCODONE IR 5 MG tablet                Protect others around you: Learn how to safely use, store and throw away your medicines at www.disposemymeds.org.        Information about OPIOIDS     PRESCRIPTION OPIOIDS: WHAT YOU NEED TO KNOW    Prescription opioids can be used to help relieve moderate to severe pain and are often prescribed following a surgery or injury, or for certain health conditions. These medications can be an important part of treatment but also come with serious risks. It is important to work with your health care provider to make sure you are getting the safest, most effective care.    WHAT ARE THE RISKS AND SIDE EFFECTS OF OPIOID USE?  Prescription opioids carry serious risks of addiction and overdose, especially with prolonged use. An opioid overdose, often marked by slowed breathing can cause sudden death. The use of prescription opioids can have a number of side effects as well, even when taken as directed:      Tolerance - meaning you might need to take more of a medication for the same pain relief    Physical dependence - meaning you have symptoms of withdrawal when a medication is stopped    Increased sensitivity to pain    Constipation    Nausea, vomiting, and dry mouth    Sleepiness and dizziness    Confusion    Depression    Low levels of testosterone that can result in lower sex drive, energy, and strength    Itching and sweating    RISKS ARE GREATER WITH:    History of drug misuse, substance use disorder, or overdose    Mental health conditions (such as depression or anxiety)    Sleep apnea    Older age (65 years or older)    Pregnancy    Avoid alcohol while taking prescription opioids.   Also, unless specifically advised by your health care provider, medications to avoid include:    Benzodiazepines (such as Xanax or Valium)    Muscle relaxants (such as Soma or Flexeril)    Hypnotics (such as Ambien or Lunesta)    Other prescription opioids    KNOW YOUR  OPTIONS:  Talk to your health care provider about ways to manage your pain that do not involve prescription opioids. Some of these options may actually work better and have fewer risks and side effects:    Pain relievers such as acetaminophen, ibuprofen, and naproxen    Some medications that are also used for depression or seizures    Physical therapy and exercise    Cognitive behavioral therapy, a psychological, goal-directed approach, in which patients learn how to modify physical, behavioral, and emotional triggers of pain and stress    IF YOU ARE PRESCRIBED OPIOIDS FOR PAIN:    Never take opioids in greater amounts or more often than prescribed    Follow up with your primary health care provider and work together to create a plan on how to manage your pain.    Talk about ways to help manage your pain that do not involve prescription opioids    Talk about all concerns and side effects    Help prevent misuse and abuse    Never sell or share prescription opioids    Never use another person's prescription opioids    Store prescription opioids in a secure place and out of reach of others (this may include visitors, children, friends, and family)    Visit www.cdc.gov/drugoverdose to learn about risks of opioid abuse and overdose    If you believe you may be struggling with addiction, tell your health care provider and ask for guidance or call [facility]'s National Helpline at 0-326-667-HELP    LEARN MORE / www.cdc.gov/drugoverdose/prescribing/guideline.html    Safely dispose of unused prescription opioids: Find your local drug take-back programs and more information about the importance of safe disposal at www.doseofreality.mn.gov             Medication List: This is a list of all your medications and when to take them. Check marks below indicate your daily home schedule. Keep this list as a reference.      Medications           Morning Afternoon Evening Bedtime As Needed    acetaminophen 325 MG tablet   Commonly known  as:  TYLENOL   Take 2 tablets (650 mg) by mouth every 4 hours   Last time this was given:  975 mg on 4/6/2018  6:48 AM                                hydrOXYzine 25 MG tablet   Commonly known as:  ATARAX   Take 1 tablet (25 mg) by mouth every 6 hours as needed for itching (and nausea)                                methylPREDNISolone 4 MG tablet   Commonly known as:  MEDROL DOSEPAK   Follow package instructions                                oxyCODONE IR 5 MG tablet   Commonly known as:  ROXICODONE   Take 1 tablet (5 mg) by mouth every 4 hours as needed for pain or other (Moderate to Severe)                                senna-docusate 8.6-50 MG per tablet   Commonly known as:  SENOKOT-S;PERICOLACE   Take 1-2 tablets by mouth 2 times daily Take while on oral narcotics to prevent or treat constipation.

## 2018-04-09 ENCOUNTER — THERAPY VISIT (OUTPATIENT)
Dept: PHYSICAL THERAPY | Facility: CLINIC | Age: 23
End: 2018-04-09
Payer: COMMERCIAL

## 2018-04-09 DIAGNOSIS — M25.561 RIGHT KNEE PAIN: Primary | ICD-10-CM

## 2018-04-09 DIAGNOSIS — Z47.89 ORTHOPEDIC AFTERCARE: ICD-10-CM

## 2018-04-09 PROCEDURE — 97140 MANUAL THERAPY 1/> REGIONS: CPT | Mod: GP | Performed by: PHYSICAL THERAPIST

## 2018-04-09 PROCEDURE — 97110 THERAPEUTIC EXERCISES: CPT | Mod: GP | Performed by: PHYSICAL THERAPIST

## 2018-04-09 PROCEDURE — 97161 PT EVAL LOW COMPLEX 20 MIN: CPT | Mod: GP | Performed by: PHYSICAL THERAPIST

## 2018-04-09 NOTE — MR AVS SNAPSHOT
After Visit Summary   4/9/2018    Nette Conklin    MRN: [id number]           Patient Information     Date Of Birth          1995        Visit Information        Provider Department      4/9/2018 2:00 PM Charu Ogden, PT [facility]        Today's Diagnoses     Right knee pain    -  1    Orthopedic aftercare           Follow-ups after your visit        Your next 10 appointments already scheduled     Apr 11, 2018  9:40 AM CDT   TOD Extremity with [facility] ([facility])    [address] [place] [address] 92[address]30 [phone]            Apr 13, 2018 11:00 AM CDT   TOD Extremity with Chang Mcguire PT   [facility] [place] ([facility])    [address] [place] [phone]            Apr 16, 2018 10:20 AM CDT   (Arrive by 10:05 AM)   RETURN KNEE with Jamie Russell MD   [facility] ([facility])    [address] [place] [id number] [phone]            Apr 17, 2018 11:40 AM CDT   TOD Extremity with [facility] ([facility])    [address] [place] [id number] [phone]            Apr 19, 2018 11:00 AM CDT   TOD Extremity with [facility] ([facility])    [address] [place] [address] [phone]            Apr 20, 2018  1:00 PM CDT   TOD Extremity with Chang Mcguire PT   [facility] [place] ([facility])    [address] [place] [id number] [phone]            Apr 23, 2018 11:20 AM CDT   TOD Extremity [facility] ([facility]    [address] [place] [address]97-[address]110 [phone]            Apr 25, 2018 12:40 PM CDT   TOD Extremity with YING Kong [facility] ([facility])    [address] [place] [phone]            Apr 30, 2018 12:00 PM CDT   TOD Extremity with YING Kong [facility] ([facility])    [address] [place] [id number] [phone]            May 02, 2018 12:40 PM CDT   TOD Extremity with YING Kong [facility] ([facility])    [address] [place] [phone]              Who to contact     If you have questions or need follow up information about today's clinic visit or your schedule please contact [facility] directly at [phone].  Normal or non-critical lab and imaging results will be communicated to you by tadoÂ°hart, letter or phone within 4 business days after the clinic has received the results. If you do not hear from us within 7 days, please contact the clinic through tadoÂ°hart or phone. If you have a critical or abnormal lab result, we will notify you by phone as soon as possible.  Submit refill requests through Saisei or call your pharmacy and they will forward the refill request to us. Please allow 3 business days for your refill to be completed.          Additional Information About Your Visit        Saisei Information     Saisei lets you send messages to your doctor, view your test results, renew your prescriptions, schedule appointments and more. To sign up, go to [email or web address] . Click on \"Log in\" on the left side of the screen, which will take you to the Welcome page. Then click on \"Sign up Now\" on the right side of the page.     You will be " asked to enter the access code listed below, as well as some personal information. Please follow the directions to create your username and password.     Your access code is: [email or web address]  Expires: 6/3/2018  7:30 AM     Your access code will  in 90 days. If you need help or a new code, please call your [place] clinic or [phone].        Care EveryWhere ID     This is your Care EveryWhere ID. This could be used by other organizations to access your [place] medical records  [id number]         Blood Pressure from Last 3 Encounters:   18 132/77   17 156/64   14 121/58    Weight from Last 3 Encounters:   18 81.6 kg (180 lb)   17 90.7 kg (200 lb)   17 86.2 kg (190 lb) (87 %)*     * Growth percentiles are based on [facility] 2-20 Years data.              We Performed the Following     HC PT EVAL, LOW COMPLEXITY     TOD INITIAL EVAL REPORT     MANUAL THER TECH,1+REGIONS,EA 15 MIN     THERAPEUTIC EXERCISES        Primary Care Provider Office Phone # Fax #    [facility] [phone] [address] [facility] [address]        Equal Access to Services     JOSSELYN ONTIVEROS : Hadii deny ku hadasho Soomaali, waaxda luqadaha, qaybta kaalmada adeegyada, gavin mckenna. So [facility] [phone].    ATENCIÓN: Si habla español, tiene a vegas disposición servicios gratuitos de asistencia lingüística. Llame al [phone].    We comply with applicable federal civil rights laws and [place] laws. We do not discriminate on the basis of race, color, national origin, age, disability, sex, sexual orientation, or gender identity.            Thank you!     Thank you for choosing [facility]  for your care. Our goal is always to provide you with excellent care. Hearing back from our patients is one way we can continue to improve our services. Please take a few minutes to complete the written survey that you may receive in  the mail after your visit with us. Thank you!             Your Updated Medication List - Protect others around you: Learn how to safely use, store and throw away your medicines at www.disposemymeds.org.          This list is accurate as of 4/9/18  3:47 PM.  Always use your most recent med list.                   Brand Name Dispense Instructions for use Diagnosis    acetaminophen 325 MG tablet    TYLENOL    100 tablet    Take 2 tablets (650 mg) by mouth every 4 hours    Status post knee surgery       hydrOXYzine 25 MG tablet    ATARAX    20 tablet    Take 1 tablet (25 mg) by mouth every 6 hours as needed for itching (and nausea)    Status post knee surgery       methylPREDNISolone 4 MG tablet    MEDROL DOSEPAK    21 tablet    Follow package instructions    Status post knee surgery       oxyCODONE IR 5 MG tablet    ROXICODONE    30 tablet    Take 1 tablet (5 mg) by mouth every 4 hours as needed for pain or other (Moderate to Severe)    Status post knee surgery       senna-docusate 8.6-50 MG per tablet    SENOKOT-S;PERICOLACE    18 tablet    Take 1-2 tablets by mouth 2 times daily Take while on oral narcotics to prevent or treat constipation.    Status post knee surgery

## 2018-04-09 NOTE — PROGRESS NOTES
"[facility] Initial Evaluation  Subjective:  Patient is a 22 year old male presenting with rehab right knee hpi.   Nette Conklin is a 22 year old male with a right knee condition.  Condition occurred with:  Contact with another person.  Condition occurred: during recreation/sport.  This is a new condition  Pt Injured his R knee in July last year while he was playing football; his was hit from behind; pt had an ACL reconstruction on 8.2.17 and had PT after that at another facility; pt had very limited ROM post surgery, \"Was stuck\"; on 4/6/18 he had an arthorscopic procedure over his R knee - lysis of adhesions and manipulation under anesthesia; post-op plan CPM and WBAT; MD recommended PT; pt on CPM all day  ROM, also has been icing  .    Patient reports pain:  Anterior.     and is intermittent   Associated with: Limping. Pain is worse during the day.  Symptoms are exacerbated by walking and bending/squatting and relieved by ice.  Since onset symptoms are gradually improving.        General health as reported by patient is good.          Current occupation is Security  Not currently working due to knee condition  .    Primary job tasks include:  Prolonged standing.                                Objective:    Gait:    Gait Type:  Antalgic   Weight Bearing Status:  WBAT     Deviations:  Knee:  Knee extension decr R and knee flexion decr R                                                      Knee Evaluation:  ROM:    AROM    Hyperextension: Left:     Right: 0  Extension: Left:    Right:  30  Flexion: Left:   Right: 60  PROM    Hyperextension: Left:   Right:  0  Extension: Left:   Right:  12  Flexion: Left:   Right:  87      Strength:           Quad Set Right: Poor    Pain:      Palpation:      Right knee tenderness present at:  Incisional  Right knee tenderness not present at:  Medial Joint Line; Lateral Joint Line and Patellar Tendon  Edema:  Edema of the knee: proximal and knee " be.    Mobility Testing:      Patellofemoral Medial:  Right: hypomobile  Patellofemoral Lateral:  Right: hypomobile  Patellofemoral Superior:  Right: hypomobile  Patellofemoral Inferior:  Right: hypomobile        General     ROS    Assessment/Plan:    Patient is a 22 year old male with right side knee complaints.    Patient has the following significant findings with corresponding treatment plan.                Diagnosis 1:  S/p R knee manipulation   Pain -  hot/cold therapy, manual therapy, self management, education, directional preference exercise and home program  Decreased ROM/flexibility - manual therapy, therapeutic exercise and home program  Decreased joint mobility - manual therapy, therapeutic exercise and home program  Decreased strength - therapeutic exercise and therapeutic activities  Decreased proprioception - neuro re-education and therapeutic activities  Impaired gait - gait training  Impaired muscle performance - neuro re-education  Decreased function - therapeutic activities    Therapy Evaluation Codes:   1) History comprised of:   Personal factors that impact the plan of care:      Time since onset of symptoms.    Comorbidity factors that impact the plan of care are:      None.     Medications impacting care: Anti-inflammatory.  2) Examination of Body Systems comprised of:   Body structures and functions that impact the plan of care:      Knee.   Activity limitations that impact the plan of care are:      Running, Sports, Squatting/kneeling, Stairs, Standing, Walking and Working.  3) Clinical presentation characteristics are:   Stable/Uncomplicated.  4) Decision-Making    High complexity using standardized patient assessment instrument and/or measureable assessment of functional outcome.  Cumulative Therapy Evaluation is: Low complexity.    Previous and current functional limitations:  (See Goal Flow Sheet for this information)    Short term and Long term goals: (See Goal Flow Sheet for this  information)     Communication ability:  Patient appears to be able to clearly communicate and understand verbal and written communication and follow directions correctly.  Treatment Explanation - The following has been discussed with the patient:   RX ordered/plan of care  Anticipated outcomes  Possible risks and side effects  This patient would benefit from PT intervention to resume normal activities.   Rehab potential is good.    Frequency:  3 X week, once daily  Duration:  for 2 weeks tapering to 2 X a week over 3 weeks  Discharge Plan:  Achieve all LTG.  Independent in home treatment program.  Return to previous functional level by discharge.  Reach maximal therapeutic benefit.    Please refer to the daily flowsheet for treatment today, total treatment time and time spent performing 1:1 timed codes.

## 2018-04-11 ENCOUNTER — THERAPY VISIT (OUTPATIENT)
Dept: PHYSICAL THERAPY | Facility: CLINIC | Age: 23
End: 2018-04-11
Payer: COMMERCIAL

## 2018-04-11 DIAGNOSIS — M25.561 RIGHT KNEE PAIN: ICD-10-CM

## 2018-04-11 DIAGNOSIS — Z47.89 ORTHOPEDIC AFTERCARE: ICD-10-CM

## 2018-04-11 PROCEDURE — 97140 MANUAL THERAPY 1/> REGIONS: CPT | Mod: GP | Performed by: PHYSICAL THERAPY ASSISTANT

## 2018-04-11 PROCEDURE — 97110 THERAPEUTIC EXERCISES: CPT | Mod: GP | Performed by: PHYSICAL THERAPY ASSISTANT

## 2018-04-11 PROCEDURE — 97016 VASOPNEUMATIC DEVICE THERAPY: CPT | Mod: GP | Performed by: PHYSICAL THERAPY ASSISTANT

## 2018-04-13 ENCOUNTER — THERAPY VISIT (OUTPATIENT)
Dept: PHYSICAL THERAPY | Facility: CLINIC | Age: 23
End: 2018-04-13
Payer: COMMERCIAL

## 2018-04-13 DIAGNOSIS — M25.561 RIGHT KNEE PAIN: ICD-10-CM

## 2018-04-13 DIAGNOSIS — Z47.89 ORTHOPEDIC AFTERCARE: ICD-10-CM

## 2018-04-13 PROCEDURE — 97016 VASOPNEUMATIC DEVICE THERAPY: CPT | Mod: GP | Performed by: PHYSICAL THERAPIST

## 2018-04-13 PROCEDURE — 97110 THERAPEUTIC EXERCISES: CPT | Mod: GP | Performed by: PHYSICAL THERAPIST

## 2018-04-13 PROCEDURE — 97140 MANUAL THERAPY 1/> REGIONS: CPT | Mod: GP | Performed by: PHYSICAL THERAPIST

## 2018-04-16 ENCOUNTER — OFFICE VISIT (OUTPATIENT)
Dept: ORTHOPEDICS | Facility: CLINIC | Age: 23
End: 2018-04-16
Payer: COMMERCIAL

## 2018-04-16 DIAGNOSIS — M24.661 ARTHROFIBROSIS OF KNEE JOINT, RIGHT: Primary | ICD-10-CM

## 2018-04-16 NOTE — PROGRESS NOTES
DIAGNOSIS:   1. Arthrofibrosis right knee following ACL reconstruction at outside hospital    PROCEDURES:  1. Manipulation under anesthesia, arthroscopic lysis of adhesions, partial lateral meniscectomy, cyclops debridement date of surgery for 4/6/2018    HISTORY:  Nette Conklin is a pleasant 22-year-old male 1 week out from his arthroscopic lysis of adhesions and manipulation under anesthesia.  He is doing well.  He is off narcotics.  His pain is controlled.  He is started therapy.  His been going 3 times.  He has a CPM machine up to 120 .    EXAM:     General: Awake, Alert, and oriented. Articulates and communicates with a normal affect     Right lower Extremity:    Incisions well healed without evidence of infection    Normal post-operative effusion and ecchymosis    Range of motion and stability exam not performed    Neurovascularly intact    IMAGING:  None    ASSESSMENT:  1. Arthrofibrosis following ACL reconstruction done at an outside hospital  2. 1 week following arthroscopic lysis of adhesions and manipulation under anesthesia    PLAN:   Weightbearing as tolerated, range of motion as tolerated, CPM until the company wants it back, complete his Medrol Dosepak, continue therapy.  Follow-up with me in 6 weeks.  Okay to return to work when able.

## 2018-04-16 NOTE — MR AVS SNAPSHOT
After Visit Summary   4/16/2018    Nette Conklin    MRN: [id number]           Patient Information     Date Of Birth          1995        Visit Information        Provider Department      4/16/2018 10:20 AM Jamie Russell MD [facility]        Today's Diagnoses     Arthrofibrosis of knee joint, right    -  1       Follow-ups after your visit        Your next 10 appointments already scheduled     Apr 17, 2018 11:40 AM CDT   TOD Extremity with [facility] ([facility])    [address] [place] [phone]            Apr 19, 2018 11:00 AM CDT   TOD Extremity with [facility] ([facility])    [address] [place] [phone]            Apr 20, 2018  1:00 PM CDT   TOD Extremity with Chang Mcguire PT   [facility] [place] (TOD[address] [address] [phone]            Apr 23, 2018 11:20 AM CDT   TOD Extremity with [facility] ([facility])    [address] [place] [id number] [phone]            Apr 25, 2018 12:40 PM CDT   TOD Extremity with [facility] ([facility])    [address] [place] [id number] [phone]            Apr 30, 2018 12:00 PM CDT   TOD Extremity with [facility] ([facility])    [address] [place] [phone] [phone]            May 02, 2018 12:40 PM CDT   TOD Extremity with [facility] ([facility])    [address] [facility] [address] [facility] [address]th [address]  [facility] [phone]            May 21, 2018 10:10 AM CDT   (Arrive by 9:55 AM)   RETURN KNEE with Jamie Russell MD   [facility] ([facility])    [address]  [address] [facility] [phone] [phone]              Who to contact     Please call your clinic at [phone] to:    Ask questions about your health    Make or cancel appointments    Discuss your medicines    Learn about your test results    Speak to your doctor            Additional Information About Your Visit        MyChart Information     ZEFRhart is an electronic gateway that provides easy, online access to your medical records. With ZEFRhart, you can request a clinic appointment, read your test results, renew a prescription or communicate with your care team.     To sign up for [employer] visit the website at [email or web address]   You will be asked to enter the access code listed below, as well as some personal information. Please follow the directions to create your username and password.     Your access code is: [email or web address]  Expires: 6/3/2018  7:30 AM     Your access code will  in 90 days. If you need help or a new code, please contact your [facility] or call [phone] for assistance.        Care EveryWhere ID     This is your Care EveryWhere ID. This could be used by other organizations to access your [place] medical records  [id number]         Blood Pressure from Last 3 Encounters:   18 132/77   17 156/64   14 121/58    Weight from Last 3 Encounters:   18 180 lb (81.6 kg)   17 200 lb (90.7 kg)   17 190 lb (86.2 kg) (87 %)*     * Growth percentiles are based on CDC 2-20 Years data.              Today, you had the following     No orders found for display       Primary Care Provider Office Phone # Fax #    Tj Pack  [facility] [phone]       [address] [facility] [address]        Equal Access to Services     JOSSELYN ONTIVEROS : Hadii aad ku hadjcarlosmakayla Herman, aparna yashnicholasha, maxine almachiquita dick, gavin quintero laShalaju mckenna. So [facility] [phone].    ATENCIÓN: Si habla español, tiene a vegas disposición servicios gratuitos de asistencia lingüística. Llame al [phone].    We comply with applicable federal civil rights laws and [place] laws. We do not discriminate on the basis of race, color, national origin, age, disability, sex, sexual orientation, or gender identity.            Thank you!     Thank you for choosing [facility]  for your care. Our goal is always to provide you with excellent care. Hearing back from our patients is one way we can continue to improve our services. Please take a few minutes to complete the written survey that you may receive in the mail after your visit with us. Thank you!             Your Updated Medication List - Protect others around you: Learn how to safely use, store and throw away your medicines at www.disposemymeds.org.          This list is accurate as of 4/16/18 11:18 AM.  Always use your most recent med list.                   Brand Name Dispense Instructions for use Diagnosis    acetaminophen 325 MG tablet    TYLENOL    100 tablet    Take 2 tablets (650 mg) by mouth every 4 hours    Status post knee surgery       hydrOXYzine 25 MG tablet    ATARAX    20 tablet    Take 1 tablet (25 mg) by mouth every 6 hours as needed for itching (and nausea)    Status post knee surgery       methylPREDNISolone 4 MG tablet    MEDROL DOSEPAK    21 tablet    Follow package instructions    Status post knee surgery       oxyCODONE IR 5 MG tablet    ROXICODONE    30 tablet    Take 1 tablet (5 mg) by mouth every 4 hours as needed for pain or other (Moderate to Severe)    Status post knee surgery       senna-docusate 8.6-50 MG per tablet     SENOKOT-S;PERICOLACE    18 tablet    Take 1-2 tablets by mouth 2 times daily Take while on oral narcotics to prevent or treat constipation.    Status post knee surgery

## 2018-04-16 NOTE — LETTER
4/16/2018     RE: Nette Conklin  [address] [place] [address]     Dear Colleague,    Thank you for referring your patient, Nette Conklin, to the [facility] at [facility]. Please see a copy of my visit note below.    DIAGNOSIS:   1. Arthrofibrosis right knee following ACL reconstruction at outside hospital    PROCEDURES:  1. Manipulation under anesthesia, arthroscopic lysis of adhesions, partial lateral meniscectomy, cyclops debridement date of surgery for 4/6/2018    HISTORY:  Nette Conklin is a pleasant 22-year-old male 1 week out from his arthroscopic lysis of adhesions and manipulation under anesthesia.  He is doing well.  He is off narcotics.  His pain is controlled.  He is started therapy.  His been going 3 times.  He has a CPM machine up to 120 .    EXAM:     General: Awake, Alert, and oriented. Articulates and communicates with a normal affect     Right lower Extremity:    Incisions well healed without evidence of infection    Normal post-operative effusion and ecchymosis    Range of motion and stability exam not performed    Neurovascularly intact    IMAGING:  None    ASSESSMENT:  1. Arthrofibrosis following ACL reconstruction done at an outside hospital  2. 1 week following arthroscopic lysis of adhesions and manipulation under anesthesia    PLAN:   Weightbearing as tolerated, range of motion as tolerated, CPM until the company wants it back, complete his Medrol Dosepak, continue therapy.  Follow-up with me in 6 weeks.  Okay to return to work when able.    Again, thank you for allowing me to participate in the care of your patient.      Sincerely,    Jamie Russell MD

## 2018-04-17 ENCOUNTER — THERAPY VISIT (OUTPATIENT)
Dept: PHYSICAL THERAPY | Facility: CLINIC | Age: 23
End: 2018-04-17
Payer: COMMERCIAL

## 2018-04-17 DIAGNOSIS — Z47.89 ORTHOPEDIC AFTERCARE: ICD-10-CM

## 2018-04-17 DIAGNOSIS — M25.561 RIGHT KNEE PAIN: ICD-10-CM

## 2018-04-17 PROCEDURE — 97110 THERAPEUTIC EXERCISES: CPT | Mod: GP | Performed by: PHYSICAL THERAPY ASSISTANT

## 2018-04-17 PROCEDURE — 97140 MANUAL THERAPY 1/> REGIONS: CPT | Mod: GP | Performed by: PHYSICAL THERAPY ASSISTANT

## 2018-04-17 PROCEDURE — 97016 VASOPNEUMATIC DEVICE THERAPY: CPT | Mod: GP | Performed by: PHYSICAL THERAPY ASSISTANT

## 2018-04-19 ENCOUNTER — THERAPY VISIT (OUTPATIENT)
Dept: PHYSICAL THERAPY | Facility: CLINIC | Age: 23
End: 2018-04-19
Payer: COMMERCIAL

## 2018-04-19 DIAGNOSIS — M25.561 RIGHT KNEE PAIN: ICD-10-CM

## 2018-04-19 DIAGNOSIS — Z47.89 ORTHOPEDIC AFTERCARE: ICD-10-CM

## 2018-04-19 PROCEDURE — 97140 MANUAL THERAPY 1/> REGIONS: CPT | Mod: GP | Performed by: PHYSICAL THERAPY ASSISTANT

## 2018-04-19 PROCEDURE — 97110 THERAPEUTIC EXERCISES: CPT | Mod: GP | Performed by: PHYSICAL THERAPY ASSISTANT

## 2018-04-20 ENCOUNTER — THERAPY VISIT (OUTPATIENT)
Dept: PHYSICAL THERAPY | Facility: CLINIC | Age: 23
End: 2018-04-20
Payer: COMMERCIAL

## 2018-04-20 DIAGNOSIS — Z47.89 ORTHOPEDIC AFTERCARE: ICD-10-CM

## 2018-04-20 DIAGNOSIS — M25.561 RIGHT KNEE PAIN: ICD-10-CM

## 2018-04-20 PROCEDURE — 97140 MANUAL THERAPY 1/> REGIONS: CPT | Mod: GP | Performed by: PHYSICAL THERAPIST

## 2018-04-20 PROCEDURE — 97110 THERAPEUTIC EXERCISES: CPT | Mod: GP | Performed by: PHYSICAL THERAPIST

## 2018-04-23 ENCOUNTER — THERAPY VISIT (OUTPATIENT)
Dept: PHYSICAL THERAPY | Facility: CLINIC | Age: 23
End: 2018-04-23
Payer: COMMERCIAL

## 2018-04-23 DIAGNOSIS — Z47.89 ORTHOPEDIC AFTERCARE: ICD-10-CM

## 2018-04-23 DIAGNOSIS — M25.561 RIGHT KNEE PAIN: ICD-10-CM

## 2018-04-23 PROCEDURE — 97112 NEUROMUSCULAR REEDUCATION: CPT | Mod: GP | Performed by: PHYSICAL THERAPIST

## 2018-04-23 PROCEDURE — 97110 THERAPEUTIC EXERCISES: CPT | Mod: GP | Performed by: PHYSICAL THERAPIST

## 2018-04-23 PROCEDURE — 97140 MANUAL THERAPY 1/> REGIONS: CPT | Mod: GP | Performed by: PHYSICAL THERAPIST

## 2018-04-25 ENCOUNTER — THERAPY VISIT (OUTPATIENT)
Dept: PHYSICAL THERAPY | Facility: CLINIC | Age: 23
End: 2018-04-25
Payer: COMMERCIAL

## 2018-04-25 DIAGNOSIS — Z47.89 ORTHOPEDIC AFTERCARE: ICD-10-CM

## 2018-04-25 DIAGNOSIS — M25.561 RIGHT KNEE PAIN: ICD-10-CM

## 2018-04-25 PROCEDURE — 97112 NEUROMUSCULAR REEDUCATION: CPT | Mod: GP | Performed by: PHYSICAL THERAPIST

## 2018-04-25 PROCEDURE — 97110 THERAPEUTIC EXERCISES: CPT | Mod: GP | Performed by: PHYSICAL THERAPIST

## 2018-04-25 PROCEDURE — 97140 MANUAL THERAPY 1/> REGIONS: CPT | Mod: GP | Performed by: PHYSICAL THERAPIST

## 2018-04-27 ENCOUNTER — THERAPY VISIT (OUTPATIENT)
Dept: PHYSICAL THERAPY | Facility: CLINIC | Age: 23
End: 2018-04-27
Payer: COMMERCIAL

## 2018-04-27 DIAGNOSIS — Z47.89 ORTHOPEDIC AFTERCARE: ICD-10-CM

## 2018-04-27 DIAGNOSIS — M25.561 RIGHT KNEE PAIN: ICD-10-CM

## 2018-04-27 PROCEDURE — 97112 NEUROMUSCULAR REEDUCATION: CPT | Mod: GP | Performed by: PHYSICAL THERAPIST

## 2018-04-27 PROCEDURE — 97530 THERAPEUTIC ACTIVITIES: CPT | Mod: GP | Performed by: PHYSICAL THERAPIST

## 2018-04-27 PROCEDURE — 97140 MANUAL THERAPY 1/> REGIONS: CPT | Mod: GP | Performed by: PHYSICAL THERAPIST

## 2018-04-30 ENCOUNTER — THERAPY VISIT (OUTPATIENT)
Dept: PHYSICAL THERAPY | Facility: CLINIC | Age: 23
End: 2018-04-30
Payer: COMMERCIAL

## 2018-04-30 DIAGNOSIS — Z47.89 ORTHOPEDIC AFTERCARE: ICD-10-CM

## 2018-04-30 DIAGNOSIS — M25.561 RIGHT KNEE PAIN: ICD-10-CM

## 2018-04-30 PROCEDURE — 97112 NEUROMUSCULAR REEDUCATION: CPT | Mod: GP | Performed by: PHYSICAL THERAPIST

## 2018-04-30 PROCEDURE — 97140 MANUAL THERAPY 1/> REGIONS: CPT | Mod: GP | Performed by: PHYSICAL THERAPIST

## 2018-04-30 PROCEDURE — 97110 THERAPEUTIC EXERCISES: CPT | Mod: GP | Performed by: PHYSICAL THERAPIST

## 2018-05-02 ENCOUNTER — THERAPY VISIT (OUTPATIENT)
Dept: PHYSICAL THERAPY | Facility: CLINIC | Age: 23
End: 2018-05-02
Payer: COMMERCIAL

## 2018-05-02 ENCOUNTER — TELEPHONE (OUTPATIENT)
Dept: ORTHOPEDICS | Facility: CLINIC | Age: 23
End: 2018-05-02

## 2018-05-02 DIAGNOSIS — M25.561 RIGHT KNEE PAIN: ICD-10-CM

## 2018-05-02 DIAGNOSIS — Z47.89 ORTHOPEDIC AFTERCARE: ICD-10-CM

## 2018-05-02 DIAGNOSIS — Z98.890 S/P RIGHT KNEE SURGERY: Primary | ICD-10-CM

## 2018-05-02 PROCEDURE — 97530 THERAPEUTIC ACTIVITIES: CPT | Mod: GP | Performed by: PHYSICAL THERAPIST

## 2018-05-02 PROCEDURE — 97112 NEUROMUSCULAR REEDUCATION: CPT | Mod: GP | Performed by: PHYSICAL THERAPIST

## 2018-05-02 PROCEDURE — 97140 MANUAL THERAPY 1/> REGIONS: CPT | Mod: GP | Performed by: PHYSICAL THERAPIST

## 2018-05-02 NOTE — TELEPHONE ENCOUNTER
----- Message from Jamie Russell MD sent at 5/2/2018 11:14 AM CDT -----  Regarding: RE: Slow Patient Progress  Good by me, happy to try that!    JM    ----- Message -----     From: Chang Mcguire, PT     Sent: 5/2/2018  10:01 AM       To: Jamie Russell MD  Subject: Slow Patient Progress                            Hi Dr. Russell,    My name is Chang Mcguire, I am one of the physical therapists that has been working with Nette since his knee manipulation/lysis about 4 weeks ago. His motion has not been making progress: I am able to get him to 0-110 during our sessions, but he always comes back in about 3-0-93 , though he states he is following up with consistent stretching.     What are your thoughts about using a DynaSplint to help maintain ROM gains?     Thanks,  Chang

## 2018-05-03 ENCOUNTER — MEDICAL CORRESPONDENCE (OUTPATIENT)
Dept: HEALTH INFORMATION MANAGEMENT | Facility: CLINIC | Age: 23
End: 2018-05-03

## 2018-05-08 ENCOUNTER — THERAPY VISIT (OUTPATIENT)
Dept: PHYSICAL THERAPY | Facility: CLINIC | Age: 23
End: 2018-05-08
Payer: COMMERCIAL

## 2018-05-08 DIAGNOSIS — M25.561 RIGHT KNEE PAIN: ICD-10-CM

## 2018-05-08 DIAGNOSIS — Z47.89 ORTHOPEDIC AFTERCARE: ICD-10-CM

## 2018-05-08 PROCEDURE — 97112 NEUROMUSCULAR REEDUCATION: CPT | Mod: GP | Performed by: PHYSICAL THERAPY ASSISTANT

## 2018-05-08 PROCEDURE — 97140 MANUAL THERAPY 1/> REGIONS: CPT | Mod: GP | Performed by: PHYSICAL THERAPY ASSISTANT

## 2018-05-08 PROCEDURE — 97110 THERAPEUTIC EXERCISES: CPT | Mod: GP | Performed by: PHYSICAL THERAPY ASSISTANT

## 2018-05-10 ENCOUNTER — THERAPY VISIT (OUTPATIENT)
Dept: PHYSICAL THERAPY | Facility: CLINIC | Age: 23
End: 2018-05-10
Payer: COMMERCIAL

## 2018-05-10 DIAGNOSIS — M25.561 RIGHT KNEE PAIN: ICD-10-CM

## 2018-05-10 DIAGNOSIS — Z47.89 ORTHOPEDIC AFTERCARE: ICD-10-CM

## 2018-05-10 PROCEDURE — 97112 NEUROMUSCULAR REEDUCATION: CPT | Mod: GP | Performed by: PHYSICAL THERAPY ASSISTANT

## 2018-05-10 PROCEDURE — 97110 THERAPEUTIC EXERCISES: CPT | Mod: GP | Performed by: PHYSICAL THERAPY ASSISTANT

## 2018-05-10 PROCEDURE — 97140 MANUAL THERAPY 1/> REGIONS: CPT | Mod: GP | Performed by: PHYSICAL THERAPY ASSISTANT

## 2018-05-15 ENCOUNTER — THERAPY VISIT (OUTPATIENT)
Dept: PHYSICAL THERAPY | Facility: CLINIC | Age: 23
End: 2018-05-15
Payer: COMMERCIAL

## 2018-05-15 DIAGNOSIS — M25.561 RIGHT KNEE PAIN: ICD-10-CM

## 2018-05-15 DIAGNOSIS — Z47.89 ORTHOPEDIC AFTERCARE: ICD-10-CM

## 2018-05-15 PROCEDURE — 97110 THERAPEUTIC EXERCISES: CPT | Mod: GP | Performed by: PHYSICAL THERAPY ASSISTANT

## 2018-05-15 PROCEDURE — 97140 MANUAL THERAPY 1/> REGIONS: CPT | Mod: GP | Performed by: PHYSICAL THERAPY ASSISTANT

## 2018-05-15 PROCEDURE — 97112 NEUROMUSCULAR REEDUCATION: CPT | Mod: GP | Performed by: PHYSICAL THERAPY ASSISTANT

## 2018-05-16 ENCOUNTER — THERAPY VISIT (OUTPATIENT)
Dept: PHYSICAL THERAPY | Facility: CLINIC | Age: 23
End: 2018-05-16
Payer: COMMERCIAL

## 2018-05-16 DIAGNOSIS — Z47.89 ORTHOPEDIC AFTERCARE: ICD-10-CM

## 2018-05-16 DIAGNOSIS — M25.561 RIGHT KNEE PAIN: ICD-10-CM

## 2018-05-16 PROCEDURE — 97140 MANUAL THERAPY 1/> REGIONS: CPT | Mod: GP | Performed by: PHYSICAL THERAPIST

## 2018-05-16 PROCEDURE — 97110 THERAPEUTIC EXERCISES: CPT | Mod: GP | Performed by: PHYSICAL THERAPIST

## 2018-05-21 ENCOUNTER — THERAPY VISIT (OUTPATIENT)
Dept: PHYSICAL THERAPY | Facility: CLINIC | Age: 23
End: 2018-05-21
Payer: COMMERCIAL

## 2018-05-21 ENCOUNTER — OFFICE VISIT (OUTPATIENT)
Dept: ORTHOPEDICS | Facility: CLINIC | Age: 23
End: 2018-05-21
Payer: COMMERCIAL

## 2018-05-21 DIAGNOSIS — Z47.89 ORTHOPEDIC AFTERCARE: ICD-10-CM

## 2018-05-21 DIAGNOSIS — M24.661 ARTHROFIBROSIS OF KNEE JOINT, RIGHT: Primary | ICD-10-CM

## 2018-05-21 DIAGNOSIS — M25.561 RIGHT KNEE PAIN: ICD-10-CM

## 2018-05-21 PROCEDURE — 97140 MANUAL THERAPY 1/> REGIONS: CPT | Mod: GP | Performed by: PHYSICAL THERAPIST

## 2018-05-21 PROCEDURE — 97112 NEUROMUSCULAR REEDUCATION: CPT | Mod: GP | Performed by: PHYSICAL THERAPIST

## 2018-05-21 PROCEDURE — 97530 THERAPEUTIC ACTIVITIES: CPT | Mod: GP | Performed by: PHYSICAL THERAPIST

## 2018-05-21 NOTE — LETTER
5/21/2018       RE: Nette Conklin  [address] [place] [address]     Dear Colleague,    Thank you for referring your patient, Nette Conklin, to the [facility] at [facility]. Please see a copy of my visit note below.    DIAGNOSIS:   1. Arthrofibrosis following ACL reconstruction which was completed at an outside hospital in August 2017    PROCEDURES:  1. ACL reconstruction at outside hospital August 2017  2. Manipulation under anesthesia, arthroscopic lysis of adhesions April 6, 2018    HISTORY:  Overall doing well, he has seen improvements in his motion he tells me he is up to 115-118 .  He started using a Dynasplint.  He has not plateaued and he feels like he is still seeing progress.  He feels he has full extension.    EXAM:   General: Awake, Alert, and oriented. Articulates and communicates with a normal affect     Right lower Extremity:    Incisions well healed without evidence of infection    Normal post-operative effusion and ecchymosis    Lachman 0, no pivot shift    Range of motion 0-115     Neurovascularly intact    IMAGING:  None    ASSESSMENT:  1. Arthrofibrosis approximately 8 months following ACL reconstruction completed in outside hospital.  2. 6 weeks following manipulation under anesthesia and arthroscopic lysis of adhesions    PLAN:   Range of motion as tolerated, continue therapy, continue Dynasplint, follow-up with me end of July which is just short of the one-year anniversary of surgery.    If he is not seeing sick continued progression with his motion he may need revision ACL reconstruction.    Otherwise we can continue to watch this as long as he still progresses.        Again, thank you for allowing me to participate in the care of your patient.      Sincerely,    Jamie Russell MD

## 2018-05-21 NOTE — NURSING NOTE
Reason For Visit:   Chief Complaint   Patient presents with     Surgical Followup     DOS 4/6/18 Exam Under Anesthesia Right Knee, Right Knee Arthroscopy, Arthrofibrosis Manipulation Under Anesthesia, Lysis of adhesions       Date of surgery: 4/6/18  Type of surgery:   PROCEDURE:  1. Examination under anesthesia right knee  2. Right knee arthroscopy  3. Arthroscopic lysis of adhesions  4. Extensive synovectomy anterior chamber, medial gutter, lateral gutter, suprapatellar pouch  5. Partial lateral menisci  6. Debridement of cyclops lesion surrounding ACL graft    Smoker: No  Request smoking cessation information: No    Pain Assessment  Patient Currently in Pain: No

## 2018-05-21 NOTE — PROGRESS NOTES
DIAGNOSIS:   1. Arthrofibrosis following ACL reconstruction which was completed at an outside hospital in August 2017    PROCEDURES:  1. ACL reconstruction at outside hospital August 2017  2. Manipulation under anesthesia, arthroscopic lysis of adhesions April 6, 2018    HISTORY:  Overall doing well, he has seen improvements in his motion he tells me he is up to 115-118 .  He started using a Dynasplint.  He has not plateaued and he feels like he is still seeing progress.  He feels he has full extension.    EXAM:   General: Awake, Alert, and oriented. Articulates and communicates with a normal affect     Right lower Extremity:    Incisions well healed without evidence of infection    Normal post-operative effusion and ecchymosis    Lachman 0, no pivot shift    Range of motion 0-115     Neurovascularly intact    IMAGING:  None    ASSESSMENT:  1. Arthrofibrosis approximately 8 months following ACL reconstruction completed in outside hospital.  2. 6 weeks following manipulation under anesthesia and arthroscopic lysis of adhesions    PLAN:   Range of motion as tolerated, continue therapy, continue Dynasplint, follow-up with me end of July which is just short of the one-year anniversary of surgery.    If he is not seeing sick continued progression with his motion he may need revision ACL reconstruction.    Otherwise we can continue to watch this as long as he still progresses.

## 2018-05-21 NOTE — MR AVS SNAPSHOT
After Visit Summary   5/21/2018    Nette Conklin    MRN: [id number]           Patient Information     Date Of Birth          1995        Visit Information        Provider Department      5/21/2018 10:10 AM Jamie Russell MD [facility]         Follow-ups after your visit        Your next 10 appointments already scheduled     May 21, 2018 11:20 AM CDT   TOD Extremity with Chang Mcguire PT   [facility] ([facility])    [address] [place] [id number] [phone]            May 23, 2018  2:00 PM CDT   TOD Extremity with Chang Mcguire PT   [facility] ([facility])    [address] [place] [address] [phone]            Jul 30, 2018 10:00 AM CDT   (Arrive by 9:45 AM)   RETURN KNEE with Jamie Russell MD   [facility] ([facility])    [address] [place] [address] [phone]              Who to contact     Please call your clinic at [phone] to:    Ask questions about your health    Make or cancel appointments    Discuss your medicines    Learn about your test results    Speak to your doctor            Additional Information About Your Visit        MyChart Information     [employer] is an electronic gateway that provides easy, online access to your medical records. With [employer], you can request a clinic appointment, read your test results, renew a prescription or communicate with your care team.     To sign up for [employer] visit the website at [email or web address]   You will be asked to enter the access code listed below, as well as some personal information. Please follow the directions to create your username and password.     Your access code is: [email or web address]  Expires: 6/3/2018  7:30 AM     Your access code will   in 90 days. If you need help or a new code, please contact your [facility] or call [phone] for assistance.        Care EveryWhere ID     This is your Care EveryWhere ID. This could be used by other organizations to access your [place] medical records  [id number]         Blood Pressure from Last 3 Encounters:   18 132/77   17 156/64   14 121/58    Weight from Last 3 Encounters:   18 180 lb (81.6 kg)   17 200 lb (90.7 kg)   17 190 lb (86.2 kg) (87 %)*     * Growth percentiles are based on [facility] 2-20 Years data.              Today, you had the following     No orders found for display       Primary Care Provider Office Phone # Fax #    Tj [facility] [phone] [address] [facility] [address]        Equal Access to Services     JOSSELYN ONTIVEROS : Hadii deny ku hadasho Soomaali, waaxda luqadaha, qaybta kaalmada adeegyada, gavin squires . So [facility] [phone].    ATENCIÓN: Si habla español, tiene a vegas disposición servicios gratuitos de asistencia lingüística. Llame al [phone].    We comply with applicable federal civil rights laws and [place] laws. We do not discriminate on the basis of race, color, national origin, age, disability, sex, sexual orientation, or gender identity.            Thank you!     Thank you for choosing [facility]  for your care. Our goal is always to provide you with excellent care. Hearing back from our patients is one way we can continue to improve our services. Please take a few minutes to complete the written survey that you may receive in the mail after your visit with us. Thank you!             Your Updated Medication List - Protect others around you: Learn how to safely use, store and throw away your medicines at www.disposemymeds.org.          This list is accurate as of 18 10:11 AM.  Always use your most recent  med list.                   Brand Name Dispense Instructions for use Diagnosis    acetaminophen 325 MG tablet    TYLENOL    100 tablet    Take 2 tablets (650 mg) by mouth every 4 hours    Status post knee surgery       hydrOXYzine 25 MG tablet    ATARAX    20 tablet    Take 1 tablet (25 mg) by mouth every 6 hours as needed for itching (and nausea)    Status post knee surgery       methylPREDNISolone 4 MG tablet    MEDROL DOSEPAK    21 tablet    Follow package instructions    Status post knee surgery       oxyCODONE IR 5 MG tablet    ROXICODONE    30 tablet    Take 1 tablet (5 mg) by mouth every 4 hours as needed for pain or other (Moderate to Severe)    Status post knee surgery       senna-docusate 8.6-50 MG per tablet    SENOKOT-S;PERICOLACE    18 tablet    Take 1-2 tablets by mouth 2 times daily Take while on oral narcotics to prevent or treat constipation.    Status post knee surgery

## 2018-05-23 ENCOUNTER — THERAPY VISIT (OUTPATIENT)
Dept: PHYSICAL THERAPY | Facility: CLINIC | Age: 23
End: 2018-05-23
Payer: COMMERCIAL

## 2018-05-23 DIAGNOSIS — Z47.89 ORTHOPEDIC AFTERCARE: ICD-10-CM

## 2018-05-23 DIAGNOSIS — M25.561 RIGHT KNEE PAIN: ICD-10-CM

## 2018-05-23 PROCEDURE — 97110 THERAPEUTIC EXERCISES: CPT | Mod: GP | Performed by: PHYSICAL THERAPIST

## 2018-05-23 PROCEDURE — 97112 NEUROMUSCULAR REEDUCATION: CPT | Mod: GP | Performed by: PHYSICAL THERAPIST

## 2018-05-31 ENCOUNTER — THERAPY VISIT (OUTPATIENT)
Dept: PHYSICAL THERAPY | Facility: CLINIC | Age: 23
End: 2018-05-31
Payer: COMMERCIAL

## 2018-05-31 DIAGNOSIS — M25.561 RIGHT KNEE PAIN: Primary | ICD-10-CM

## 2018-05-31 DIAGNOSIS — Z47.89 ORTHOPEDIC AFTERCARE: ICD-10-CM

## 2018-05-31 PROCEDURE — 97140 MANUAL THERAPY 1/> REGIONS: CPT | Mod: GP | Performed by: PHYSICAL THERAPY ASSISTANT

## 2018-05-31 PROCEDURE — 97112 NEUROMUSCULAR REEDUCATION: CPT | Mod: GP | Performed by: PHYSICAL THERAPY ASSISTANT

## 2018-05-31 PROCEDURE — 97530 THERAPEUTIC ACTIVITIES: CPT | Mod: GP | Performed by: PHYSICAL THERAPY ASSISTANT

## 2018-06-04 ENCOUNTER — THERAPY VISIT (OUTPATIENT)
Dept: PHYSICAL THERAPY | Facility: CLINIC | Age: 23
End: 2018-06-04
Payer: COMMERCIAL

## 2018-06-04 DIAGNOSIS — M25.561 RIGHT KNEE PAIN: ICD-10-CM

## 2018-06-04 DIAGNOSIS — Z47.89 ORTHOPEDIC AFTERCARE: ICD-10-CM

## 2018-06-04 PROCEDURE — 97110 THERAPEUTIC EXERCISES: CPT | Mod: GP | Performed by: PHYSICAL THERAPIST

## 2018-06-11 ENCOUNTER — THERAPY VISIT (OUTPATIENT)
Dept: PHYSICAL THERAPY | Facility: CLINIC | Age: 23
End: 2018-06-11
Payer: COMMERCIAL

## 2018-06-11 DIAGNOSIS — Z47.89 ORTHOPEDIC AFTERCARE: ICD-10-CM

## 2018-06-11 DIAGNOSIS — M25.561 RIGHT KNEE PAIN: ICD-10-CM

## 2018-06-11 PROCEDURE — 97110 THERAPEUTIC EXERCISES: CPT | Mod: GP | Performed by: PHYSICAL THERAPIST

## 2018-06-11 PROCEDURE — 97140 MANUAL THERAPY 1/> REGIONS: CPT | Mod: GP | Performed by: PHYSICAL THERAPIST

## 2018-06-11 PROCEDURE — 97530 THERAPEUTIC ACTIVITIES: CPT | Mod: GP | Performed by: PHYSICAL THERAPIST

## 2018-06-25 ENCOUNTER — OFFICE VISIT (OUTPATIENT)
Dept: ORTHOPEDICS | Facility: CLINIC | Age: 23
End: 2018-06-25
Payer: COMMERCIAL

## 2018-06-25 ENCOUNTER — THERAPY VISIT (OUTPATIENT)
Dept: PHYSICAL THERAPY | Facility: CLINIC | Age: 23
End: 2018-06-25
Payer: COMMERCIAL

## 2018-06-25 DIAGNOSIS — M24.661 ARTHROFIBROSIS OF KNEE JOINT, RIGHT: Primary | ICD-10-CM

## 2018-06-25 DIAGNOSIS — Z47.89 ORTHOPEDIC AFTERCARE: ICD-10-CM

## 2018-06-25 DIAGNOSIS — M25.561 RIGHT KNEE PAIN: ICD-10-CM

## 2018-06-25 PROCEDURE — 97112 NEUROMUSCULAR REEDUCATION: CPT | Mod: GP | Performed by: PHYSICAL THERAPIST

## 2018-06-25 PROCEDURE — 97530 THERAPEUTIC ACTIVITIES: CPT | Mod: GP | Performed by: PHYSICAL THERAPIST

## 2018-06-25 NOTE — PROGRESS NOTES
Assessment/Plan:    PROGRESS  REPORT      SUBJECTIVE  Subjective changes noted by patient:  Pt states that he really has not noticed any change in ROM or overall function, though may be getting stronger. He states that he can perform all daily tasks and most activities without any issue, but would eventually like to get back to sports.       Current pain level is 0/10  .     Initial Pain level: 0/10.   Changes in function:  None  Adverse reaction to treatment or activity: None    OBJECTIVE  Changes noted in objective findings:     -ROM: 3-0-107  -SLR with 3* lag  -Squat: depth limited by lack of flexion ROM - some dynamic valgus, corrected with cueing  -SL Squat: good control, limited by ROM        ASSESSMENT/PLAN  Updated problem list and treatment plan: Diagnosis 1:  S/p Knee KALEY, cyclops debridement  Decreased ROM/flexibility - manual therapy, therapeutic exercise, therapeutic activity and home program  Decreased joint mobility - manual therapy, therapeutic exercise, therapeutic activity and home program  Decreased strength - therapeutic exercise, therapeutic activities and home program  Impaired gait - gait training and home program  Decreased function - therapeutic activities and home program  STG/LTGs have been met or progress has been made towards goals:  Progress halted  Assessment of Progress: The patient's condition is unchanged.  Self Management Plans:  Patient is independent in a home treatment program.  I have re-evaluated this patient and find that the nature, scope, duration and intensity of the therapy is appropriate for the medical condition of the patient.  Nette continues to require the following intervention to meet STG and LTG's:  PT    Recommendations:  This patient would benefit from further evaluation. - Referred back to Dr. Russell for re-evaluation given lack of progress with ROM    Please refer to the daily flowsheet for treatment today, total treatment time and time spent performing  1:1 timed codes.

## 2018-06-25 NOTE — PROGRESS NOTES
Chief Concern/Diagnosis: f/u 11 weeks from R knee arthroscopy and lysis of adhesions    Treatment:    R knee arthroscopy and lysis of adhesions    Subjective:  Nette is a pleasant 23 yo male presenting for follow up 11 weeks after right knee arthroscopy and lysis of adhesions for decreased ROM 2/2 arthrofibrosis after ACL resconstruction. He has been diligently attending therapy and wears his dynasplint for multiple hours per day. He has had no pain. He can do most things he wants to do, including lifting weights and squatting. He states that he would like to have more ROM, but he is happy with the current results. Nette has not tried running. He is currently between work and would like to speak to a  about disability benefits.     Physical Examination:    Gen:  Alert, no acute distress, well nourished, appears stated age    Psych:  Normal affect, nonpressured speech    Musculoskeletal:  RLE: Neurovascularly intact. Knee ROM = -5 to 110. No noticeable effusion around knee. Midline scar over patella. Arthroscopy port sites healed. No crepitus noted. Patellar tracking normally. Lachman negative. No varus/valgus instability.  Pre-op ROM: 10-90 degrees    Assessment:   22 year old male with the followin. 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 Healed well. ROM improving.    Recommendations:  1. Continue therapy and use of dynasplint.   2. Advance activity as tolerated. No restrictions.  3. RTC if ROM decreasing or not improving to point of patient satisfaction. Would consider repeat lysis of adhesions in the future.  4. Patient to see  in clinic before leaving    Total visit duration: 20 min. > 50% time spent in providing direct care and counseling.     Seen with Dr. Russell, documentation by:    Sammy Borges, MS4     Patient seen and examined and agree with the daughter as dictated by Sammy Borges.     Patient returns to my  clinic now 11 weeks following his manipulation under anesthesia and arthroscopic lysis of adhesions overall patient is doing well he seen improvement of his preoperative state with particularly in regards to terminal extension he still notes some limitation of flexion and only has approximately 110 .  Overall he is quite functional is able to do most things that he wants to do    At this time we have elected to proceed with expectant observation to see how he does.  If he sees lasting improvement no future surgery necessary.  In fact I am not planning in the right now for his extension contracture if he should see limitations in the future we could could consider revision manipulation under anesthesia or potentially even ACL revision reconstruction

## 2018-06-25 NOTE — MR AVS SNAPSHOT
After Visit Summary   6/25/2018    Nette Conklin    MRN: [id number]           Patient Information     Date Of Birth          1995        Visit Information        Provider Department      6/25/2018 11:20 AM Chang Mcguire PT [facility]        Today's Diagnoses     Right knee pain        Orthopedic aftercare           Follow-ups after your visit        Your next 10 appointments already scheduled     Jun 25, 2018 12:40 PM CDT   (Arrive by 12:25 PM)   RETURN KNEE with Jamie Russell MD   [facility] ([facility])    [address] [place] [id number]   [phone]            Jul 02, 2018 11:20 AM CDT   TOD Extremity with [facility] ([facility])    [address] [place] [id number] [phone]            Jul 09, 2018 11:20 AM CDT   TOD Extremity with [facility] ([facility])    [address] [place] [id number] [phone]            Jul 16, 2018 11:20 AM CDT   TOD Extremity with [facility] ([facility])    [address] [place] [id number] [phone]            Jul 23, 2018 11:20 AM CDT   TOD Extremity with [facility] ([facility])    [address] [place] [id number] [phone]            Jul 30, 2018  9:20 AM CDT   TOD Extremity with [facility] ([facility])    [address] [place] [id number] [phone]            Jul 30, 2018 10:00 AM CDT   (Arrive by 9:45 AM)   RETURN KNEE with Jamie Russell MD    [facility] ([facility])    [address] [address] [facility]  4th Floor  [facility] [address] [phone]              Who to contact     If you have questions or need follow up information about today's clinic visit or your schedule please contact [facility] directly at [phone].  Normal or non-critical lab and imaging results will be communicated to you by MyChart, letter or phone within 4 business days after the clinic has received the results. If you do not hear from us within 7 days, please contact the clinic through MyChart or phone. If you have a critical or abnormal lab result, we will notify you by phone as soon as possible.  Submit refill requests through [employer] or call your pharmacy and they will forward the refill request to us. Please allow 3 business days for your refill to be completed.          Additional Information About Your Visit        Care EveryWhere ID     This is your Care EveryWhere ID. This could be used by other organizations to access your [place] medical records  [id number]         Blood Pressure from Last 3 Encounters:   04/06/18 132/77   07/09/17 156/64   12/06/14 121/58    Weight from Last 3 Encounters:   03/14/18 81.6 kg (180 lb)   07/09/17 90.7 kg (200 lb)   05/23/17 86.2 kg (190 lb) (87 %)*     * Growth percentiles are based on CDC 2-20 Years data.              We Performed the Following     NEUROMUSCULAR RE-EDUCATION     THERAPEUTIC ACTIVITIES        Primary Care Provider Office Phone # Fax #    T[facility] [phone] [address] [facility] [address]        Equal Access to Services     [facility] : Hadii deny villalta hadasho Soedmond, waaxda luqadaha, qaybta kaalmada adecarroll, gavin mckenna. So [facility] [phone].    ATENCIÓN: Si habla español, tiene a vegas disposición servicios gratuitos de asistencia lingüística. Feame al [phone].    We  comply with applicable federal civil rights laws and [place] laws. We do not discriminate on the basis of race, color, national origin, age, disability, sex, sexual orientation, or gender identity.            Thank you!     Thank you for choosing [facility]  for your care. Our goal is always to provide you with excellent care. Hearing back from our patients is one way we can continue to improve our services. Please take a few minutes to complete the written survey that you may receive in the mail after your visit with us. Thank you!             Your Updated Medication List - Protect others around you: Learn how to safely use, store and throw away your medicines at www.disposemymeds.org.          This list is accurate as of 6/25/18 12:30 PM.  Always use your most recent med list.                   Brand Name Dispense Instructions for use Diagnosis    acetaminophen 325 MG tablet    TYLENOL    100 tablet    Take 2 tablets (650 mg) by mouth every 4 hours    Status post knee surgery       hydrOXYzine 25 MG tablet    ATARAX    20 tablet    Take 1 tablet (25 mg) by mouth every 6 hours as needed for itching (and nausea)    Status post knee surgery       methylPREDNISolone 4 MG tablet    MEDROL DOSEPAK    21 tablet    Follow package instructions    Status post knee surgery       oxyCODONE IR 5 MG tablet    ROXICODONE    30 tablet    Take 1 tablet (5 mg) by mouth every 4 hours as needed for pain or other (Moderate to Severe)    Status post knee surgery       senna-docusate 8.6-50 MG per tablet    SENOKOT-S;PERICOLACE    18 tablet    Take 1-2 tablets by mouth 2 times daily Take while on oral narcotics to prevent or treat constipation.    Status post knee surgery

## 2018-06-25 NOTE — LETTER
2018       RE: Nette Conklin  [address] [place] [address]     Dear Colleague,    Thank you for referring your patient, Nette Conklin, to the [facility] at [facility]. Please see a copy of my visit note below.    Chief Concern/Diagnosis: f/u 11 weeks from R knee arthroscopy and lysis of adhesions    Treatment:    R knee arthroscopy and lysis of adhesions    Subjective:  Nette is a pleasant 23 yo male presenting for follow up 11 weeks after right knee arthroscopy and lysis of adhesions for decreased ROM 2/2 arthrofibrosis after ACL resconstruction. He has been diligently attending therapy and wears his dynasplint for multiple hours per day. He has had no pain. He can do most things he wants to do, including lifting weights and squatting. He states that he would like to have more ROM, but he is happy with the current results. Nette has not tried running. He is currently between work and would like to speak to a  about disability benefits.     Physical Examination:    Gen:  Alert, no acute distress, well nourished, appears stated age    Psych:  Normal affect, nonpressured speech    Musculoskeletal:  RLE: Neurovascularly intact. Knee ROM = -5 to 110. No noticeable effusion around knee. Midline scar over patella. Arthroscopy port sites healed. No crepitus noted. Patellar tracking normally. Lachman negative. No varus/valgus instability.  Pre-op ROM: 10-90 degrees    Assessment:   22 year old male with the followin. 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 Healed well. ROM improving.    Recommendations:  1. Continue therapy and use of dynasplint.   2. Advance activity as tolerated. No restrictions.  3. RTC if ROM decreasing or not improving to point of patient satisfaction. Would consider repeat lysis of adhesions in the future.  4. Patient to see  in clinic before  leaving    Total visit duration: 20 min. > 50% time spent in providing direct care and counseling.     Seen with Dr. Russell, documentation by:    Sammy Borges, MS4     Patient seen and examined and agree with the daughter as dictated by Sammy Borges.     Patient returns to my clinic now 11 weeks following his manipulation under anesthesia and arthroscopic lysis of adhesions overall patient is doing well he seen improvement of his preoperative state with particularly in regards to terminal extension he still notes some limitation of flexion and only has approximately 110 .  Overall he is quite functional is able to do most things that he wants to do    At this time we have elected to proceed with expectant observation to see how he does.  If he sees lasting improvement no future surgery necessary.  In fact I am not planning in the right now for his extension contracture if he should see limitations in the future we could could consider revision manipulation under anesthesia or potentially even ACL revision reconstruction     Again, thank you for allowing me to participate in the care of your patient.      Sincerely,    Jamie Russell MD

## 2018-06-25 NOTE — MR AVS SNAPSHOT
After Visit Summary   6/25/2018    Nette Conklin    MRN: [id number]           Patient Information     Date Of Birth          1995        Visit Information        Provider Department      6/25/2018 12:40 PM Jamie Russell MD [facility]        Today's Diagnoses     Arthrofibrosis of knee joint, right    -  1       Follow-ups after your visit        Your next 10 appointments already scheduled     Jul 02, 2018 11:20 AM CDT   TOD Extremity with Chang Mcguire PT   [facility] ([facility])    [address] [place] [id number] [phone]            Jul 09, 2018 11:20 AM CDT   TOD Extremity with Chang Mcguire PT   [facility] ([facility])    [address] [place] [id number] [phone]            Jul 16, 2018 11:20 AM CDT   OTD Extremity with Chang Mcguire PT   [facility] ([facility])    [address] [place] [id number] [phone]            Jul 23, 2018 11:20 AM CDT   TOD Extremity with Chang Mcguire PT   [facility] ([facility])    [address] [place] [id number] [phone]            Jul 30, 2018  9:20 AM CDT   TOD Extremity with Chang Mcguire PT    [facility] ([facility])    [address] [place] [id number] [phone]            Jul 30, 2018 10:00 AM CDT   (Arrive by 9:45 AM)   RETURN KNEE with Jamie Russell MD   [facility] ([facility])    [address] [place] [id number] [phone]              Who to contact     Please call your clinic at [phone] to:    Ask questions about your  health    Make or cancel appointments    Discuss your medicines    Learn about your test results    Speak to your doctor            Additional Information About Your Visit        Care EveryWhere ID     This is your Care EveryWhere ID. This could be used by other organizations to access your [facility] medical records  [id number]         Blood Pressure from Last 3 Encounters:   04/06/18 132/77   07/09/17 156/64   12/06/14 121/58    Weight from Last 3 Encounters:   03/14/18 81.6 kg (180 lb)   07/09/17 90.7 kg (200 lb)   05/23/17 86.2 kg (190 lb) (87 %)*     * Growth percentiles are based on [facility] 2-20 Years data.              Today, you had the following     No orders found for display       Primary Care Provider Office Phone # Fax #    Tj [facility] [phone] [address] [facility] [address]        Equal Access to Services     JOSSELYN ONTIVEROS : Hadii aad ku hadasho Soedmond, waaxda luqadaha, qaybta kaalmada adeegyada, gavin squires . So [facility] [phone].    ATENCIÓN: Si habla español, tiene a vegas disposición servicios gratuitos de asistencia lingüística. Vikki al [phone].    We comply with applicable federal civil rights laws and [place] laws. We do not discriminate on the basis of race, color, national origin, age, disability, sex, sexual orientation, or gender identity.            Thank you!     Thank you for choosing [facility]  for your care. Our goal is always to provide you with excellent care. Hearing back from our patients is one way we can continue to improve our services. Please take a few minutes to complete the written survey that you may receive in the mail after your visit with us. Thank you!             Your Updated Medication List - Protect others around you: Learn how to safely use, store and throw away your medicines at www.disposemymeds.org.          This list is accurate as of 6/25/18  2:36 PM.  Always  use your most recent med list.                   Brand Name Dispense Instructions for use Diagnosis    acetaminophen 325 MG tablet    TYLENOL    100 tablet    Take 2 tablets (650 mg) by mouth every 4 hours    Status post knee surgery       hydrOXYzine 25 MG tablet    ATARAX    20 tablet    Take 1 tablet (25 mg) by mouth every 6 hours as needed for itching (and nausea)    Status post knee surgery       methylPREDNISolone 4 MG tablet    MEDROL DOSEPAK    21 tablet    Follow package instructions    Status post knee surgery       oxyCODONE IR 5 MG tablet    ROXICODONE    30 tablet    Take 1 tablet (5 mg) by mouth every 4 hours as needed for pain or other (Moderate to Severe)    Status post knee surgery       senna-docusate 8.6-50 MG per tablet    SENOKOT-S;PERICOLACE    18 tablet    Take 1-2 tablets by mouth 2 times daily Take while on oral narcotics to prevent or treat constipation.    Status post knee surgery

## 2018-06-25 NOTE — NURSING NOTE
Reason For Visit:   Chief Complaint   Patient presents with     Right Knee - Surgical Followup     DOS 4/6/18   Exam Under Anesthesia Right Knee, Right Knee Arthroscopy, Arthrofibrosis Manipulation Under Anesthesia, Lysis of adhesions     Date of surgery: 4/6/18  Type of surgery:   PROCEDURE:  1. Examination under anesthesia right knee  2. Right knee arthroscopy  3. Arthroscopic lysis of adhesions  4. Extensive synovectomy anterior chamber, medial gutter, lateral gutter, suprapatellar pouch  5. Partial lateral menisci  6. Debridement of cyclops lesion surrounding ACL graft    Smoker: No  Request smoking cessation information: No    Pain Assessment  Patient Currently in Pain: No

## 2018-07-09 ENCOUNTER — THERAPY VISIT (OUTPATIENT)
Dept: PHYSICAL THERAPY | Facility: CLINIC | Age: 23
End: 2018-07-09
Payer: COMMERCIAL

## 2018-07-09 DIAGNOSIS — Z47.89 ORTHOPEDIC AFTERCARE: ICD-10-CM

## 2018-07-09 DIAGNOSIS — M25.561 RIGHT KNEE PAIN: ICD-10-CM

## 2018-07-09 PROCEDURE — 97112 NEUROMUSCULAR REEDUCATION: CPT | Mod: GP | Performed by: PHYSICAL THERAPIST

## 2018-07-09 PROCEDURE — 97530 THERAPEUTIC ACTIVITIES: CPT | Mod: GP | Performed by: PHYSICAL THERAPIST

## 2018-07-30 ENCOUNTER — OFFICE VISIT (OUTPATIENT)
Dept: ORTHOPEDICS | Facility: CLINIC | Age: 23
End: 2018-07-30
Payer: COMMERCIAL

## 2018-07-30 DIAGNOSIS — M24.661 ARTHROFIBROSIS OF KNEE JOINT, RIGHT: Primary | ICD-10-CM

## 2018-07-30 NOTE — NURSING NOTE
Reason For Visit:   Chief Complaint   Patient presents with     Right Knee - Surgical Followup     DOS 4/6/18 Exam Under Anesthesia Right Knee, Right Knee Arthroscopy, Arthrofibrosis Manipulation Under Anesthesia, Lysis of adhesions     Date of surgery: 4/6/18  Type of surgery:   PROCEDURE:  1. Examination under anesthesia right knee  2. Right knee arthroscopy  3. Arthroscopic lysis of adhesions  4. Extensive synovectomy anterior chamber, medial gutter, lateral gutter, suprapatellar pouch  5. Partial lateral menisci  6. Debridement of cyclops lesion surrounding ACL graft    Smoker: No  Request smoking cessation information: No    Pain Assessment  Patient Currently in Pain: No    There were no vitals taken for this visit.       No Known Allergies    Current Outpatient Prescriptions   Medication     methylPREDNISolone (MEDROL DOSEPAK) 4 MG tablet     acetaminophen (TYLENOL) 325 MG tablet     hydrOXYzine (ATARAX) 25 MG tablet     oxyCODONE IR (ROXICODONE) 5 MG tablet     senna-docusate (SENOKOT-S;PERICOLACE) 8.6-50 MG per tablet     No current facility-administered medications for this visit.            Palak Gutierrez, ATC

## 2018-07-30 NOTE — MR AVS SNAPSHOT
After Visit Summary   7/30/2018    Nette Conklin    MRN: [id number]           Patient Information     Date Of Birth          1995        Visit Information        Provider Department      7/30/2018 10:00 AM Jamie Russell MD [facility]        Today's Diagnoses     Arthrofibrosis of knee joint, right    -  1       Follow-ups after your visit        Your next 10 appointments already scheduled     Aug 01, 2018 11:00 AM CDT   TOD Extremity with Erika Khan PTA   [facility] ([facility])    [address] [address] [phone]              Who to contact     Please call your clinic at [phone] to:    Ask questions about your health    Make or cancel appointments    Discuss your medicines    Learn about your test results    Speak to your doctor            Additional Information About Your Visit        Care EveryWhere ID     This is your Care EveryWhere ID. This could be used by other organizations to access your [facility] medical records  [id number]         Blood Pressure from Last 3 Encounters:   04/06/18 132/77   07/09/17 156/64   12/06/14 121/58    Weight from Last 3 Encounters:   03/14/18 81.6 kg (180 lb)   07/09/17 90.7 kg (200 lb)   05/23/17 86.2 kg (190 lb) (87 %)*     * Growth percentiles are based on CDC 2-20 Years data.              Today, you had the following     No orders found for display       Primary Care Provider Office Phone # Fax #    Allankit [facility] [phone] [address] [facility] [place] [facility] [address]        Equal Access to Services     JOSSELYN ONTIVEROS : Hadii aad ku hadasho Soomaali, waaxda luqadaha, qaybta kaalmada adeegyaruthy, gavin squires . So [facility] [phone].    ATENCIÓN: Si habla español, tiene a vegas disposición servicios gratuitos de asistencia lingüística. Llame al [phone].    We comply  with applicable federal civil rights laws and [place] laws. We do not discriminate on the basis of race, color, national origin, age, disability, sex, sexual orientation, or gender identity.            Thank you!     Thank you for choosing [facility]  for your care. Our goal is always to provide you with excellent care. Hearing back from our patients is one way we can continue to improve our services. Please take a few minutes to complete the written survey that you may receive in the mail after your visit with us. Thank you!             Your Updated Medication List - Protect others around you: Learn how to safely use, store and throw away your medicines at www.disposemymeds.org.          This list is accurate as of 7/30/18 11:05 AM.  Always use your most recent med list.                   Brand Name Dispense Instructions for use Diagnosis    acetaminophen 325 MG tablet    TYLENOL    100 tablet    Take 2 tablets (650 mg) by mouth every 4 hours    Status post knee surgery       hydrOXYzine 25 MG tablet    ATARAX    20 tablet    Take 1 tablet (25 mg) by mouth every 6 hours as needed for itching (and nausea)    Status post knee surgery       methylPREDNISolone 4 MG tablet    MEDROL DOSEPAK    21 tablet    Follow package instructions    Status post knee surgery       oxyCODONE IR 5 MG tablet    ROXICODONE    30 tablet    Take 1 tablet (5 mg) by mouth every 4 hours as needed for pain or other (Moderate to Severe)    Status post knee surgery       senna-docusate 8.6-50 MG per tablet    SENOKOT-S;PERICOLACE    18 tablet    Take 1-2 tablets by mouth 2 times daily Take while on oral narcotics to prevent or treat constipation.    Status post knee surgery

## 2018-07-30 NOTE — LETTER
2018       RE: Nette Conklin  [address] [place] [address]     Dear Colleague,    Thank you for referring your patient, Nette Conklin, [facility]. Please see a copy of my visit note below.    Diagnosis:   Right knee anterior cruciate ligament reconstruction, [facility], 2017  R knee arthroscopy and lysis of adhesions 2018    Treatment:    R knee arthroscopy and lysis of adhesions    Subjective:  Nette is a pleasant 23 yo male presenting for follow up 3 months after manipulation under anesthesia. He is happy with his current range of motion. He has no pain. He works with therapy once or twice a month. He is not doing his exercises at home other than maybe once a week due to working full time. Currently he feels that he is happy with his knee and is able to do everything that he wants at this point.    Physical Examination:    Gen:  Alert, no acute distress, well nourished, appears stated age    Psych:  Normal affect, nonpressured speech    Musculoskeletal:  RLE: Neurovascularly intact. Knee ROM = 0 to 100. No noticeable effusion around knee. Midline scar over patella. Arthroscopy port sites healed. No crepitus noted. Patellar tracking normally. Lachman negative. No varus/valgus instability.  Pre-op ROM: 10-90 degrees    Assessment:   22 year old male with the followin. 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 Healed well. ROM stable compared to prior exam but patient happy with outcome.    Recommendations:  1. Continue therapy and use of dynasplint, discussed that improvement in his range of motion is dependent on his participation in his therapy and rehabilitation.  2. Advance activity as tolerated. No restrictions.  3. RTC if ROM decreasing or not improving to point of patient satisfaction. Would consider repeat lysis of adhesions in the future and/or revision  anterior cruciate ligament.     Seen with Dr. Russell,      Patient seen and examined with the resident.     Assesment:  right knee ACL at OSH    3 months following eric / cathy    Plan:  motion (especially extension improved)   No restrictions on motion or strength   Transition back to desired activities   If fails to progress, consider revision acl   F/u prn     I agree with history, physical and imaging as well as the assessment and plan as detailed by Dr. Ba.       Again, thank you for allowing me to participate in the care of your patient.      Sincerely,    Jamie Russell MD

## 2018-07-30 NOTE — PROGRESS NOTES
Patient seen and examined with the resident.     Assesment:  right knee ACL at OSH    3 months following eric / cathy    Plan:  motion (especially extension improved)   No restrictions on motion or strength   Transition back to desired activities   If fails to progress, consider revision acl   F/u prn     I agree with history, physical and imaging as well as the assessment and plan as detailed by Dr. Ba.

## 2018-07-30 NOTE — PROGRESS NOTES
Diagnosis:   Right knee anterior cruciate ligament reconstruction, [facility], 2017  R knee arthroscopy and lysis of adhesions 2018    Treatment:    R knee arthroscopy and lysis of adhesions    Subjective:  Nette is a pleasant 21 yo male presenting for follow up 3 months after manipulation under anesthesia. He is happy with his current range of motion. He has no pain. He works with therapy once or twice a month. He is not doing his exercises at home other than maybe once a week due to working full time. Currently he feels that he is happy with his knee and is able to do everything that he wants at this point.    Physical Examination:    Gen:  Alert, no acute distress, well nourished, appears stated age    Psych:  Normal affect, nonpressured speech    Musculoskeletal:  RLE: Neurovascularly intact. Knee ROM = 0 to 100. No noticeable effusion around knee. Midline scar over patella. Arthroscopy port sites healed. No crepitus noted. Patellar tracking normally. Lachman negative. No varus/valgus instability.  Pre-op ROM: 10-90 degrees    Assessment:   22 year old male with the followin. 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 Healed well. ROM stable compared to prior exam but patient happy with outcome.    Recommendations:  1. Continue therapy and use of dynasplint, discussed that improvement in his range of motion is dependent on his participation in his therapy and rehabilitation.  2. Advance activity as tolerated. No restrictions.  3. RTC if ROM decreasing or not improving to point of patient satisfaction. Would consider repeat lysis of adhesions in the future and/or revision anterior cruciate ligament.     Seen with Dr. Russell

## 2018-08-01 ENCOUNTER — THERAPY VISIT (OUTPATIENT)
Dept: PHYSICAL THERAPY | Facility: CLINIC | Age: 23
End: 2018-08-01
Payer: COMMERCIAL

## 2018-08-01 DIAGNOSIS — M25.561 RIGHT KNEE PAIN: Primary | ICD-10-CM

## 2018-08-01 DIAGNOSIS — Z47.89 ORTHOPEDIC AFTERCARE: ICD-10-CM

## 2018-08-01 PROCEDURE — 97530 THERAPEUTIC ACTIVITIES: CPT | Mod: GP | Performed by: PHYSICAL THERAPY ASSISTANT

## 2018-08-01 PROCEDURE — 97112 NEUROMUSCULAR REEDUCATION: CPT | Mod: GP | Performed by: PHYSICAL THERAPY ASSISTANT

## 2018-08-01 PROCEDURE — 97110 THERAPEUTIC EXERCISES: CPT | Mod: GP | Performed by: PHYSICAL THERAPY ASSISTANT

## 2018-08-17 ENCOUNTER — TELEPHONE (OUTPATIENT)
Dept: ORTHOPEDICS | Facility: CLINIC | Age: 23
End: 2018-08-17

## 2018-08-17 NOTE — TELEPHONE ENCOUNTER
[facility]    Phone Message    May a detailed message be left on voicemail: yes    Reason for Call: Form or Letter   Type or form/letter needing completion: Medical Necessity form Dated 8.2.18  Provider: Dr. Russell  Date form needed: 8.2.18  Once completed: Fax form to: [phone]      Action Taken: Message routed to:  [facility] ump ortho

## 2018-10-19 ENCOUNTER — TELEPHONE (OUTPATIENT)
Dept: ORTHOPEDICS | Facility: CLINIC | Age: 23
End: 2018-10-19

## 2018-10-19 NOTE — TELEPHONE ENCOUNTER
[facility]    Phone Message    May a detailed message be left on voicemail: yes    Reason for Call: Other: Stefanie waiting for Cert for Med Necessety faxed to us 9/4/18, please fill out and fax to [phone]     Action Taken: Message routed to:  [facility] Ortho Clinic

## 2018-10-19 NOTE — TELEPHONE ENCOUNTER
Returned call to patient. Patient states that he already has a job and Cert for Med Necessity is no longer needed. Patient was advised to call the clinic if he needs further assistance.

## 2019-07-10 ENCOUNTER — OFFICE VISIT (OUTPATIENT)
Dept: ORTHOPEDICS | Facility: CLINIC | Age: 24
End: 2019-07-10
Payer: COMMERCIAL

## 2019-07-10 DIAGNOSIS — Z98.890 S/P RIGHT KNEE SURGERY: Primary | ICD-10-CM

## 2019-07-10 NOTE — LETTER
7/10/2019      RE: Nette Conklin  [address] [place] [address]       Nette Conklin returns to my clinic today for interval follow-up is a 24-year-old male who had his ACL reconstructed in outside hospital.  He had significant arthrofibrosis after surgery and approximately 1 year ago I did taken to the operating room where an extensive lysis of adhesions was performed as well as a manipulation under anesthesia.  This has improved his motion.  It certainly not normal and he continues to lack a few degrees of flexion.  He admits he is been somewhat noncompliant with his postop therapy program.  He is not particular interested in more surgery at this time.    Examination shows a pleasant woman no acute distress articulate and interactive.  Visual inspection shows no redness no drainage no suggestion of infection about his knee.  His ACL is stable at this time.  His he is lacking 10 degrees of terminal flexion.  His extension is nearly normal.  1 quadrant medial lateral patellar translation    Clinical assessment: Arthrofibrosis following ACL reconstruction 1 year status post manipulation under anesthesia and arthroscopic lysis of adhesions.    Plan: I long discussion with the patient regarding his knee.  At this time we are going to try to recommit ourselves to physical therapy program.  We will work on building strength building his motion.  Realistically if he should continue to have problems we may ultimately need to consider revision ACL reconstructive surgery.  With that said I think trying to exhaust all nonsurgical means should be our first step.    Jamie Russell MD

## 2019-07-15 NOTE — PROGRESS NOTES
Nette Conklin returns to my clinic today for interval follow-up is a 24-year-old male who had his ACL reconstructed in outside hospital.  He had significant arthrofibrosis after surgery and approximately 1 year ago I did taken to the operating room where an extensive lysis of adhesions was performed as well as a manipulation under anesthesia.  This has improved his motion.  It certainly not normal and he continues to lack a few degrees of flexion.  He admits he is been somewhat noncompliant with his postop therapy program.  He is not particular interested in more surgery at this time.    Examination shows a pleasant woman no acute distress articulate and interactive.  Visual inspection shows no redness no drainage no suggestion of infection about his knee.  His ACL is stable at this time.  His he is lacking 10 degrees of terminal flexion.  His extension is nearly normal.  1 quadrant medial lateral patellar translation    Clinical assessment: Arthrofibrosis following ACL reconstruction 1 year status post manipulation under anesthesia and arthroscopic lysis of adhesions.    Plan: I long discussion with the patient regarding his knee.  At this time we are going to try to recommit ourselves to physical therapy program.  We will work on building strength building his motion.  Realistically if he should continue to have problems we may ultimately need to consider revision ACL reconstructive surgery.  With that said I think trying to exhaust all nonsurgical means should be our first step.

## 2019-07-16 ENCOUNTER — THERAPY VISIT (OUTPATIENT)
Dept: PHYSICAL THERAPY | Facility: CLINIC | Age: 24
End: 2019-07-16
Payer: COMMERCIAL

## 2019-07-16 DIAGNOSIS — Z98.890 S/P RIGHT KNEE SURGERY: ICD-10-CM

## 2019-07-16 PROCEDURE — 97161 PT EVAL LOW COMPLEX 20 MIN: CPT | Mod: GP | Performed by: PHYSICAL THERAPIST

## 2019-07-16 PROCEDURE — 97112 NEUROMUSCULAR REEDUCATION: CPT | Mod: GP | Performed by: PHYSICAL THERAPIST

## 2019-07-16 ASSESSMENT — ACTIVITIES OF DAILY LIVING (ADL)
KNEE_ACTIVITY_OF_DAILY_LIVING_SCORE: 88.57
KNEE_ACTIVITY_OF_DAILY_LIVING_SUM: 62
SQUAT: ACTIVITY IS MINIMALLY DIFFICULT
STIFFNESS: I DO NOT HAVE THE SYMPTOM
KNEEL ON THE FRONT OF YOUR KNEE: ACTIVITY IS SOMEWHAT DIFFICULT
RAW_SCORE: 62
GO DOWN STAIRS: ACTIVITY IS NOT DIFFICULT
AS_A_RESULT_OF_YOUR_KNEE_INJURY,_HOW_WOULD_YOU_RATE_YOUR_CURRENT_LEVEL_OF_DAILY_ACTIVITY?: NORMAL
STAND: ACTIVITY IS NOT DIFFICULT
RISE FROM A CHAIR: ACTIVITY IS NOT DIFFICULT
WALK: ACTIVITY IS NOT DIFFICULT
GO UP STAIRS: ACTIVITY IS NOT DIFFICULT
GIVING WAY, BUCKLING OR SHIFTING OF KNEE: THE SYMPTOM AFFECTS MY ACTIVITY SLIGHTLY
WEAKNESS: THE SYMPTOM AFFECTS MY ACTIVITY SLIGHTLY
HOW_WOULD_YOU_RATE_THE_CURRENT_FUNCTION_OF_YOUR_KNEE_DURING_YOUR_USUAL_DAILY_ACTIVITIES_ON_A_SCALE_FROM_0_TO_100_WITH_100_BEING_YOUR_LEVEL_OF_KNEE_FUNCTION_PRIOR_TO_YOUR_INJURY_AND_0_BEING_THE_INABILITY_TO_PERFORM_ANY_OF_YOUR_USUAL_DAILY_ACTIVITIES?: 100
LIMPING: I DO NOT HAVE THE SYMPTOM
HOW_WOULD_YOU_RATE_THE_OVERALL_FUNCTION_OF_YOUR_KNEE_DURING_YOUR_USUAL_DAILY_ACTIVITIES?: NEARLY NORMAL
SWELLING: I HAVE THE SYMPTOM BUT IT DOES NOT AFFECT MY ACTIVITY
SIT WITH YOUR KNEE BENT: ACTIVITY IS NOT DIFFICULT
PAIN: I DO NOT HAVE THE SYMPTOM

## 2019-07-16 NOTE — PROGRESS NOTES
[facility] Initial Evaluation  Subjective:  \[facility] [place] [facility]\""                  [facility]  Physical Therapy Initial Examination/Evaluation  July 16, 2019    Nette Conklin is a 24 year old  male referred to physical therapy by Dr. Russell for treatment of knee weakness with Precautions/Restrictions/MD instructions none    KEY PT FINDINGS:  1.  Functional valgus with SL squat  2.  Excellent proximal hip strength; fair quadriceps strength  3.  Decrease in flexion ROM ~10 deg - previous history of arthrofibrosis    Subjective:  Referring MD visit date: 7/10/19  DOI/onset: ACL-R 2017; KALEY and DARLINE August 2018  Mechanism of injury: playing football, developed significant arthrofibrosis after surgery  DOS see above  Previous treatment: PT  Imaging: MRI, xray  Chief Complaint:   Feels weak   Pain: best 0 /10, worst 0/10 no pain Described as: none Alleviated by: rest Frequency: intermittent Progression of symptoms since initial onset: improving Time of day when pain is worse: not related  Sleeping: not affected    Occupation: [employer]  Job duties:  Walking, prepping food    Current HEP/exercise regimen: squatting, lunges, leg press, leg curls  Patient's goals are improve motion and strength    Pertinent PMH: none   General Health Reported by Patient: excellent  Return to MD:  PRN        Lower Extremity Exam    Dynamic Movement Screen:  2 leg stance: pes planus  2 leg squat:Excessive weight shift to L limb noted    1 leg stance:   Right: proprioceptive challenge  Left: proprioceptive challenge    1 leg squat:   Right: normal  Left: proprioceptive challenge, excessive contralateral pelvic drop and excessive femoral IR/ADD    Gait: WNL    Patellofemoral Joint Examination:  Test Right Left   Effusion 0 0   Quad Activation Fair Normal     Knee Joint AROM   Right Left Difference   Hyperextension 5 deg 5 deg 0 deg   Extension 0 deg 0 deg 0 deg   Flexion 120 deg 130  deg 10 deg     Lower Extremity Muscle Strength (x/5)   Right Left   Hip ABD 5/5 5/5       Objective:  System    Physical Exam    General     ROS    Assessment/Plan:    Patient is a 24 year old male with right side knee complaints.    Patient has the following significant findings with corresponding treatment plan.                Diagnosis 1:  S/p ACLR    Decreased strength - therapeutic exercise and therapeutic activities  Impaired balance - neuro re-education and therapeutic activities  Decreased proprioception - neuro re-education and therapeutic activities  Impaired muscle performance - neuro re-education  Decreased function - therapeutic activities    Therapy Evaluation Codes:   1) History comprised of:   Personal factors that impact the plan of care:      None.    Comorbidity factors that impact the plan of care are:      None.     Medications impacting care: None.  2) Examination of Body Systems comprised of:   Body structures and functions that impact the plan of care:      Knee.   Activity limitations that impact the plan of care are:      Sports, Squatting/kneeling, Walking and Working.  3) Clinical presentation characteristics are:   Stable/Uncomplicated.  4) Decision-Making    Low complexity using standardized patient assessment instrument and/or measureable assessment of functional outcome.  Cumulative Therapy Evaluation is: Low complexity.    Previous and current functional limitations:  (See Goal Flow Sheet for this information)    Short term and Long term goals: (See Goal Flow Sheet for this information)     Communication ability:  Patient appears to be able to clearly communicate and understand verbal and written communication and follow directions correctly.  Treatment Explanation - The following has been discussed with the patient:   RX ordered/plan of care  Anticipated outcomes  Possible risks and side effects  This patient would benefit from PT intervention to resume normal activities.   Rehab  potential is good.    Frequency:  2 X month, once daily  Duration:  for 2 months  Discharge Plan:  Achieve all LTG.  Independent in home treatment program.  Reach maximal therapeutic benefit.    Please refer to the daily flowsheet for treatment today, total treatment time and time spent performing 1:1 timed codes.

## 2019-09-30 PROBLEM — Z47.89 ORTHOPEDIC AFTERCARE: Status: RESOLVED | Noted: 2018-04-09 | Resolved: 2019-09-30

## 2019-09-30 PROBLEM — M25.561 RIGHT KNEE PAIN: Status: RESOLVED | Noted: 2018-04-09 | Resolved: 2019-09-30

## 2023-03-24 PROCEDURE — 99284 EMERGENCY DEPT VISIT MOD MDM: CPT | Mod: 25

## 2023-03-24 NOTE — ED TRIAGE NOTES
Patient stated that he had blood work done recently, now complaining of right arm pain where he had the blood drawn.

## 2023-03-25 ENCOUNTER — APPOINTMENT (OUTPATIENT)
Dept: ULTRASOUND IMAGING | Facility: CLINIC | Age: 28
End: 2023-03-25
Attending: EMERGENCY MEDICINE
Payer: COMMERCIAL

## 2023-03-25 ENCOUNTER — HOSPITAL ENCOUNTER (EMERGENCY)
Facility: CLINIC | Age: 28
Discharge: HOME | End: 2023-03-25
Attending: EMERGENCY MEDICINE | Admitting: EMERGENCY MEDICINE
Payer: COMMERCIAL

## 2023-03-25 VITALS
TEMPERATURE: 98 F | RESPIRATION RATE: 18 BRPM | HEART RATE: 66 BPM | DIASTOLIC BLOOD PRESSURE: 83 MMHG | HEIGHT: 74 IN | BODY MASS INDEX: 24.38 KG/M2 | WEIGHT: 190 LBS | SYSTOLIC BLOOD PRESSURE: 133 MMHG | OXYGEN SATURATION: 99 %

## 2023-03-25 DIAGNOSIS — S50.12XA TRAUMATIC HEMATOMA OF LEFT FOREARM, INITIAL ENCOUNTER: ICD-10-CM

## 2023-03-25 PROCEDURE — 93971 EXTREMITY STUDY: CPT | Mod: LT

## 2023-03-25 ASSESSMENT — ACTIVITIES OF DAILY LIVING (ADL): ADLS_ACUITY_SCORE: 35

## 2023-03-25 NOTE — ED PROVIDER NOTES
History     Chief Complaint:  Arm Pain     HPI   Nette Conklin is a 27 year old male who presents with left forearm pain.  He states that he went to clinic recently and had blood draws.  He states that he has had arm pain since that time.  He has tried icing it as well as taking ibuprofen which have not helped.  He states the pain is most notable with specific movements.  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  No redness or swelling.  No changes to the skin.  No other complaints.      Independent Historian:    None    Review of External Notes: Recent clinic notes reviewed.  Had blood tests performed for STD screening as well and has testosterone use    ROS:  Review of Systems  10 systems reviewed and negative except as above and in HPI.    Allergies:  No Known Allergies     Medications:    acetaminophen (TYLENOL) 325 MG tablet  hydrOXYzine (ATARAX) 25 MG tablet  methylPREDNISolone (MEDROL DOSEPAK) 4 MG tablet  oxyCODONE IR (ROXICODONE) 5 MG tablet  senna-docusate (SENOKOT-S;PERICOLACE) 8.6-50 MG per tablet        Past Medical History:    Past Medical History:   Diagnosis Date     Acne      Mass of eye, right        Past Surgical History:    Past Surgical History:   Procedure Laterality Date     ARTHROSCOPY KNEE Right 4/6/2018    Procedure: ARTHROSCOPY KNEE;  Exam Under Anesthesia Right Knee, Right Knee Arthroscopy, Arthrofibrosis Manipulation Under Anesthesia, Lysis of adhesions;  Surgeon: Jamie Russell MD;  Location:  OR     CRANIOTOMY  2/25/2013    Procedure: CRANIOTOMY;  Right Orbital Reconstruction, Removal of Temporal Lobe Lesion;  Surgeon: Jonn Flores MD;  Location:  OR     ORBITOTOMY  2/25/2013    Procedure: ORBITOTOMY;;  Surgeon: Dante Davis MD;  Location: [facility]     ORTHOPEDIC SURGERY Right     Knee scope     resection of right orbital mass[      2005        Family History:    family history  "is not on file.    Social History:   reports that he has never smoked. He has never used smokeless tobacco. He reports that he does not drink alcohol and does not use drugs.  PCP: [facility]     Physical Exam     Patient Vitals for the past 24 hrs:   BP Temp Temp src Pulse Resp SpO2 Height Weight   03/25/23 0020 -- -- -- -- -- -- 1.88 m (6' 2\") 86.2 kg (190 lb)   03/25/23 0017 139/82 -- -- -- -- -- -- --   03/25/23 0016 -- -- -- -- -- 99 % -- --   03/24/23 2006 119/84 98  F (36.7  C) Temporal 60 18 98 % -- --        Physical Exam  General: Alert, No distress. Nontoxic appearance  Head: No signs of trauma.   Mouth/Throat: Oropharynx moist.   Eyes: Conjunctivae are normal. Pupils are equal..   Neck: Normal range of motion.    CV: Appears well perfused.  Resp:No respiratory distress.   MSK: Left forearm without point tenderness.  No redness or erythema.  Strong distal pulses.  No palpable thrombophlebitis.  Normal range of motion. No obvious deformity.   Neuro: The patient is alert and interactive. BARRAZA. Speech normal. GCS 15  Skin: No lesion or sign of trauma noted.   Psych: normal mood and affect. behavior is normal.       Emergency Department Course     Imaging:  US Upper Extremity Venous Duplex Left   Final Result   IMPRESSION:    1.  No deep venous thrombosis in the left upper extremity.        Report per radiology     Emergency Department Course & Assessments:  Patient seen and examined  Results reviewed  Patient discharged    Disposition:  The patient was discharged to home.     Impression & Plan      Medical Decision Making:  Nette Conklin is a 27 year old male presents complaining of left forearm pain following a venipuncture several days ago.  He states that he has had multiple IVs in the past and that he typically does not have pain like this.  He states that his pain hurts more with use especially working moving furniture.  He denies any injury or excess strain and he believes that " his symptoms are due to his blood draw.  I do not see any areas of cellulitis.  There is no swelling.  Very small bruise present.  No deformity.  He is able to use the arm appropriately.  He was very concerned for vascular etiology therefore a duplex ultrasound was obtained and was unremarkable.  He was instructed to use heat or ice and follow-up with his primary care provider or orthopedics should his symptoms persist.  We did discuss that his symptoms may be secondary to a sprain or other musculoskeletal etiology rather than his blood draw and if his symptoms do persist he will follow-up as instructed.    Diagnosis:    ICD-10-CM    1. Traumatic hematoma of left forearm, initial encounter  S50.12XA            3/25/2023   Adri Villeda MD Debroux, Karah M, MD  03/25/23 0749

## 2023-03-25 NOTE — ED NOTES
From triage complaining of sharp pain 10/10 score but no pain while resting  after blood works extraction last Monday.  Pulse is palpable,sats on the affected arm was 99%,cold to touch compared to right,no discoloration

## 2025-05-03 NOTE — LETTER
EMERGENCY DEPARTMENT  [address] [facility] [address]  [phone]    2017    Nette Conklin  [address] [address]  [phone] (home)     : 1995      To Whom it may concern:    Nette Conklin was seen in our Emergency Department today, 2017.  I expect his condition to improve over the next 1 week.  He may return to work after orthopedic evaluation.    Sincerely,        Christianne Cabral    
General

## (undated) DEVICE — BNDG SPANDAGRIP SZ J LF BEIGE 6.75" SAG13117

## (undated) DEVICE — BUR ARTHREX COOLCUT SABRE 3.8MMX13CM AR-8380SR

## (undated) DEVICE — PACK ARTHROSCOPY CUSTOM ASC

## (undated) DEVICE — PREP DURAPREP REMOVER 4OZ 8611

## (undated) DEVICE — PAD ARMBOARD FOAM EGGCRATE COVIDEN 3114367

## (undated) DEVICE — LINEN TOWEL PACK X5 5464

## (undated) DEVICE — SUCTION MANIFOLD NEPTUNE 2 SYS 4 PORT 0702-020-000

## (undated) DEVICE — SOL NACL 0.9% IRRIG 3000ML BAG 2B7477

## (undated) DEVICE — LINEN DRAPE 54X72" 5467

## (undated) DEVICE — GLOVE PROTEXIS POWDER FREE SMT 8.0  2D72PT80X

## (undated) DEVICE — TUBING SYSTEM ARTHREX PATIENT REDEUCE AR-6421

## (undated) DEVICE — TUBING SYSTEM ARTHREX PUMP REDEUCE AR-6411

## (undated) DEVICE — SU ETHILON 3-0 PS-1 18" 1663G

## (undated) DEVICE — PREP DURAPREP 26ML APL 8630

## (undated) RX ORDER — GABAPENTIN 300 MG/1
CAPSULE ORAL
Status: DISPENSED
Start: 2018-04-06

## (undated) RX ORDER — BUPIVACAINE HYDROCHLORIDE 2.5 MG/ML
INJECTION, SOLUTION EPIDURAL; INFILTRATION; INTRACAUDAL
Status: DISPENSED
Start: 2018-04-06

## (undated) RX ORDER — CEFAZOLIN SODIUM 1 G/3ML
INJECTION, POWDER, FOR SOLUTION INTRAMUSCULAR; INTRAVENOUS
Status: DISPENSED
Start: 2018-04-06

## (undated) RX ORDER — OXYCODONE HYDROCHLORIDE 5 MG/1
TABLET ORAL
Status: DISPENSED
Start: 2018-04-06

## (undated) RX ORDER — ACETAMINOPHEN 325 MG/1
TABLET ORAL
Status: DISPENSED
Start: 2018-04-06

## (undated) RX ORDER — HYDROMORPHONE HYDROCHLORIDE 1 MG/ML
INJECTION, SOLUTION INTRAMUSCULAR; INTRAVENOUS; SUBCUTANEOUS
Status: DISPENSED
Start: 2018-04-06

## (undated) RX ORDER — FENTANYL CITRATE 50 UG/ML
INJECTION, SOLUTION INTRAMUSCULAR; INTRAVENOUS
Status: DISPENSED
Start: 2018-04-06

## (undated) RX ORDER — EPINEPHRINE 1 MG/ML
INJECTION, SOLUTION, CONCENTRATE INTRAVENOUS
Status: DISPENSED
Start: 2018-04-06